# Patient Record
Sex: FEMALE | Race: WHITE
[De-identification: names, ages, dates, MRNs, and addresses within clinical notes are randomized per-mention and may not be internally consistent; named-entity substitution may affect disease eponyms.]

---

## 2017-09-23 ENCOUNTER — HOSPITAL ENCOUNTER (EMERGENCY)
Dept: HOSPITAL 8 - ED | Age: 62
Discharge: HOME | End: 2017-09-23
Payer: COMMERCIAL

## 2017-09-23 VITALS — DIASTOLIC BLOOD PRESSURE: 75 MMHG | SYSTOLIC BLOOD PRESSURE: 131 MMHG

## 2017-09-23 VITALS — WEIGHT: 239.2 LBS | HEIGHT: 67 IN | BODY MASS INDEX: 37.54 KG/M2

## 2017-09-23 DIAGNOSIS — K29.00: Primary | ICD-10-CM

## 2017-09-23 LAB
AST SERPL-CCNC: 10 U/L (ref 15–37)
BUN SERPL-MCNC: 10 MG/DL (ref 7–18)
HCT VFR BLD CALC: 48.3 % (ref 34.6–47.8)
HGB BLD-MCNC: 16.2 G/DL (ref 11.7–16.4)
WBC # BLD AUTO: 6.5 X10^3/UL (ref 3.4–10)

## 2017-09-23 PROCEDURE — 96361 HYDRATE IV INFUSION ADD-ON: CPT

## 2017-09-23 PROCEDURE — 83690 ASSAY OF LIPASE: CPT

## 2017-09-23 PROCEDURE — 85025 COMPLETE CBC W/AUTO DIFF WBC: CPT

## 2017-09-23 PROCEDURE — 81001 URINALYSIS AUTO W/SCOPE: CPT

## 2017-09-23 PROCEDURE — 36415 COLL VENOUS BLD VENIPUNCTURE: CPT

## 2017-09-23 PROCEDURE — 99285 EMERGENCY DEPT VISIT HI MDM: CPT

## 2017-09-23 PROCEDURE — 80053 COMPREHEN METABOLIC PANEL: CPT

## 2017-09-23 PROCEDURE — 87086 URINE CULTURE/COLONY COUNT: CPT

## 2017-09-23 PROCEDURE — 96374 THER/PROPH/DIAG INJ IV PUSH: CPT

## 2018-04-02 ENCOUNTER — OFFICE VISIT (OUTPATIENT)
Dept: URGENT CARE | Facility: CLINIC | Age: 63
End: 2018-04-02
Payer: COMMERCIAL

## 2018-04-02 VITALS
BODY MASS INDEX: 37.51 KG/M2 | HEART RATE: 67 BPM | HEIGHT: 67 IN | TEMPERATURE: 98.2 F | SYSTOLIC BLOOD PRESSURE: 128 MMHG | OXYGEN SATURATION: 93 % | WEIGHT: 239 LBS | DIASTOLIC BLOOD PRESSURE: 84 MMHG | RESPIRATION RATE: 20 BRPM

## 2018-04-02 DIAGNOSIS — G56.92 NEUROPATHY OF HAND, LEFT: ICD-10-CM

## 2018-04-02 PROCEDURE — 99204 OFFICE O/P NEW MOD 45 MIN: CPT | Performed by: PHYSICIAN ASSISTANT

## 2018-04-02 ASSESSMENT — ENCOUNTER SYMPTOMS
VOMITING: 0
DOUBLE VISION: 0
MUSCLE WEAKNESS: 0
SHORTNESS OF BREATH: 0
DIZZINESS: 0
FOCAL WEAKNESS: 0
NAUSEA: 0
TINGLING: 1
SENSORY CHANGE: 0
NUMBNESS: 0
PALPITATIONS: 0
HEADACHES: 0
COUGH: 0
CHILLS: 0
BLURRED VISION: 0
FEVER: 0

## 2018-04-02 NOTE — PROGRESS NOTES
"Subjective:      Aicha Choudhary is a 62 y.o. female who presents with Arm Pain (since midnight last night shes had left arm pain and numbness)            Arm Pain    Incident onset: no incident. Arm pain started 12 hours ago- improving  The incident occurred at home. The injury mechanism was repetitive motion (patient reports turning knobs all day yesterday. Woke up at midnight with pain and pins and needles in lower left arm). The pain is present in the left forearm and left hand. The quality of the pain is described as aching (tingling ). The pain is moderate. The pain has been improving since the incident. Associated symptoms include tingling. Pertinent negatives include no chest pain, muscle weakness or numbness. The symptoms are aggravated by movement. She has tried nothing for the symptoms.       Past Medical History:   Diagnosis Date   • Depression    • Hx of hysterectomy        Past Surgical History:   Procedure Laterality Date   • HERNIA REPAIR     • OTHER  left knee surgery       No family history on file.    Allergies   Allergen Reactions   • Keflex    • Penicillins        Medications, Allergies, and current problem list reviewed today in Epic    Review of Systems   Constitutional: Negative for chills, fever and malaise/fatigue.   Eyes: Negative for blurred vision and double vision.   Respiratory: Negative for cough and shortness of breath.    Cardiovascular: Negative for chest pain, palpitations and leg swelling.        No jaw pain or left shoulder pain    Gastrointestinal: Negative for nausea and vomiting.   Musculoskeletal: Positive for joint pain (left hand pain and swelling).   Skin: Negative for itching and rash.   Neurological: Positive for tingling. Negative for dizziness, sensory change, focal weakness, numbness and headaches.       All other systems reviewed and are negative.      Objective:     /84   Pulse 67   Temp 36.8 °C (98.2 °F)   Resp 20   Ht 1.702 m (5' 7\")   Wt 108.4 kg " (239 lb)   SpO2 93%   BMI 37.43 kg/m²      Physical Exam   Constitutional: She is oriented to person, place, and time. She appears well-developed and well-nourished. No distress.   HENT:   Head: Normocephalic and atraumatic.   Eyes: Conjunctivae are normal.   Cardiovascular: Normal rate, regular rhythm and normal heart sounds.  Exam reveals no gallop and no friction rub.    No murmur heard.  Pulmonary/Chest: Effort normal and breath sounds normal. No respiratory distress. She has no decreased breath sounds. She has no wheezes. She has no rhonchi. She has no rales.   Musculoskeletal:   Left hand: diffuse mild edema. FROM. Pain with flexion of fingers. Describes tingling along ulnar and radial nerve distribution. Tinnel's test negative for cubital tunnel syndrome. No erythema. Distal n/v intact.     Neurological: She is alert and oriented to person, place, and time. No cranial nerve deficit.   CN II-XII intact. Cerebellar function  intact. Motor coordination intact.  strength strong and symmetrical bilaterally. Proprioception intact. Strength 5/5 equal in the upper and lower extremities. Facial features symmetric with equal movement. No focal deficits      Psychiatric: She has a normal mood and affect. Her behavior is normal. Judgment and thought content normal.               Assessment/Plan:     1. Neuropathy of hand, left    - suspect CTS. Recommended EKG- patient refused.  Encouraged immobilization, decrease repetitive motion.  Brace given to patient. OTC NSAIDS    Strict ER precautions discussed.  ER evaluation if any worsening symptoms, paralysis, localized weakness, vision changes, confusion, slurred speech.     Differential diagnoses, Supportive care, and indications for immediate follow-up discussed with patient.   Instructed to return to clinic or nearest emergency department for any change in condition, further concerns, or worsening of symptoms.    The patient demonstrated a good understanding and  agreed with the treatment plan.    Ines Myers P.A.-C.

## 2018-04-02 NOTE — LETTER
April 2, 2018         Patient: Aicha Choudhary   YOB: 1955   Date of Visit: 4/2/2018           To Whom it May Concern:    Aicha Choudhary was seen in my clinic on 4/2/2018. She may return to work on 4/4/18 if feeling better.    If you have any questions or concerns, please don't hesitate to call.        Sincerely,           Ines Myers P.A.-C.  Electronically Signed

## 2018-04-04 ENCOUNTER — OFFICE VISIT (OUTPATIENT)
Dept: URGENT CARE | Facility: CLINIC | Age: 63
End: 2018-04-04
Payer: COMMERCIAL

## 2018-04-04 VITALS
SYSTOLIC BLOOD PRESSURE: 128 MMHG | RESPIRATION RATE: 16 BRPM | HEART RATE: 60 BPM | OXYGEN SATURATION: 95 % | DIASTOLIC BLOOD PRESSURE: 80 MMHG | TEMPERATURE: 98.8 F | BODY MASS INDEX: 37.67 KG/M2 | HEIGHT: 67 IN | WEIGHT: 240 LBS

## 2018-04-04 DIAGNOSIS — M79.2 NEUROPATHIC PAIN OF LEFT HAND: ICD-10-CM

## 2018-04-04 PROCEDURE — 99213 OFFICE O/P EST LOW 20 MIN: CPT | Performed by: NURSE PRACTITIONER

## 2018-04-04 RX ORDER — METHYLPREDNISOLONE 4 MG/1
4 TABLET ORAL DAILY
Qty: 1 KIT | Refills: 0 | Status: SHIPPED | OUTPATIENT
Start: 2018-04-04 | End: 2019-01-18 | Stop reason: CLARIF

## 2018-04-04 ASSESSMENT — ENCOUNTER SYMPTOMS
TINGLING: 1
SENSORY CHANGE: 1
CONSTITUTIONAL NEGATIVE: 1
GASTROINTESTINAL NEGATIVE: 1
RESPIRATORY NEGATIVE: 1

## 2018-04-04 ASSESSMENT — PAIN SCALES - GENERAL: PAINLEVEL: 4=SLIGHT-MODERATE PAIN

## 2018-04-04 NOTE — PROGRESS NOTES
"Subjective:      Aicha Choudhary is a 62 y.o. female who presents with Wrist Pain (arm and hand numbness, when take brace off hand still goes numb and would like the steroids that were discussed on monday )  Surgical HX reviewed in epic and not pertinent to today's visit  Past Medical History:   Diagnosis Date   • Depression    • Hx of hysterectomy      Current medications reviewed.  Social History   Substance Use Topics   • Smoking status: Former Smoker   • Smokeless tobacco: Not on file   • Alcohol use Yes      Comment: occasional             HPI  Chief Complaint   Patient presents with   • Wrist Pain     arm and hand numbness, when take brace off hand still goes numb and would like the steroids that were discussed on monday    as above. She was seen 2 days ago for same complaint. She stats it is not better. She complains of numbness to her 4th and 5th digits. She has her brace in place. Pain 4/10 and more annoying. She has chronic pain management. She is NOT requesting narcotics. No other aggravating or alleviating factors.     Review of Systems   Constitutional: Negative.    HENT: Negative.    Respiratory: Negative.    Gastrointestinal: Negative.    Musculoskeletal:        Left hand/elbow pain   Skin: Negative.    Neurological: Positive for tingling and sensory change.   All other systems reviewed and are negative.         Objective:     /80   Pulse 60   Temp 37.1 °C (98.8 °F)   Resp 16   Ht 1.702 m (5' 7\")   Wt 108.9 kg (240 lb)   SpO2 95%   Breastfeeding? No   BMI 37.59 kg/m²      Physical Exam   Constitutional: She is oriented to person, place, and time. She appears well-developed and well-nourished.   HENT:   Head: Normocephalic.   Eyes: EOM are normal.   Neck: Normal range of motion.   Pulmonary/Chest: Effort normal. No respiratory distress.   Musculoskeletal:   Left elbow/wrist with mild pain with ROM   Distal ROM intact    Neurological: She is alert and oriented to person, place, and " time.   Skin: Skin is warm and dry. Capillary refill takes less than 2 seconds.   Psychiatric: She has a normal mood and affect.   Nursing note and vitals reviewed.         Reviewed note for 4/2/18      Assessment/Plan:     1. Neuropathic pain of left hand  MethylPREDNISolone (MEDROL DOSEPAK) 4 MG Tablet Therapy Pack     Work note  F/u with PCP - if no resolve would recommend OT and possible injections    Please make an appointment for follow up with your primary care provider. Return for any change in condition, worsening of symptoms, or any other concerns. Please go to the nearest emergency room for any shortness of breath or chest pain or for any of the emergent conditions we have discussed. Patient states understanding to plan of care and discharge instructions.    Please note that this dictation was created using voice recognition software. I have worked with consultants from the vendor as well as technical experts from [facility] [employer] to optimize the interface. I have made every reasonable attempt to correct obvious errors, but I expect that there are errors of grammar and possibly content that I did not discover before finalizing the note.

## 2018-04-04 NOTE — LETTER
April 4, 2018         Patient: Aicha Choudhary   YOB: 1955   Date of Visit: 4/4/2018           To Whom it May Concern:    Aicha Choudhary was seen in my clinic on 4/4/2018. Please excuse her from work today. She may return to work on next scheduled day of 4/8/18 without restrictions     If you have any questions or concerns, please don't hesitate to call.        Sincerely,           TARYN Locke.P.N.  Electronically Signed

## 2018-05-07 ENCOUNTER — OFFICE VISIT (OUTPATIENT)
Dept: URGENT CARE | Facility: CLINIC | Age: 63
End: 2018-05-07
Payer: COMMERCIAL

## 2018-05-07 VITALS
RESPIRATION RATE: 18 BRPM | BODY MASS INDEX: 38.92 KG/M2 | DIASTOLIC BLOOD PRESSURE: 78 MMHG | TEMPERATURE: 98.2 F | OXYGEN SATURATION: 92 % | WEIGHT: 248 LBS | HEART RATE: 72 BPM | HEIGHT: 67 IN | SYSTOLIC BLOOD PRESSURE: 120 MMHG

## 2018-05-07 DIAGNOSIS — H11.89 CONJUNCTIVAL IRRITATION: ICD-10-CM

## 2018-05-07 DIAGNOSIS — H11.001 PTERYGIUM EYE, RIGHT: ICD-10-CM

## 2018-05-07 PROCEDURE — 99203 OFFICE O/P NEW LOW 30 MIN: CPT | Performed by: EMERGENCY MEDICINE

## 2018-05-07 RX ORDER — POLYMYXIN B SULFATE AND TRIMETHOPRIM 1; 10000 MG/ML; [USP'U]/ML
1 SOLUTION OPHTHALMIC EVERY 4 HOURS
Qty: 10 ML | Refills: 0 | Status: SHIPPED | OUTPATIENT
Start: 2018-05-07 | End: 2019-01-18 | Stop reason: CLARIF

## 2018-05-07 RX ORDER — KETOROLAC TROMETHAMINE 5 MG/ML
1 SOLUTION OPHTHALMIC 4 TIMES DAILY
Qty: 1 BOTTLE | Refills: 0 | Status: SHIPPED | OUTPATIENT
Start: 2018-05-07 | End: 2019-01-18 | Stop reason: CLARIF

## 2018-05-07 ASSESSMENT — ENCOUNTER SYMPTOMS
EYE ITCHING: 0
PHOTOPHOBIA: 0
FEVER: 0
DOUBLE VISION: 0
FOREIGN BODY SENSATION: 1
EYE DISCHARGE: 1
EYE REDNESS: 1
BLURRED VISION: 0
NAUSEA: 0
VOMITING: 0

## 2018-05-07 NOTE — PROGRESS NOTES
Subjective:      Aicha Choudhary is a 62 y.o. female who presents with Eye Problem (Since yesterday right eye weeping .)            Eye Problem    The right eye is affected. This is a new problem. Episode onset: 2 days. The problem occurs daily. The problem has been gradually worsening. There was no injury mechanism. The pain is mild. There is no known exposure to pink eye. She does not wear contacts. Associated symptoms include an eye discharge, eye redness and a foreign body sensation. Pertinent negatives include no blurred vision, double vision, fever, itching, nausea, photophobia, recent URI or vomiting. Treatments tried: cool compress. The treatment provided mild relief.       Review of Systems   Constitutional: Negative for fever.   Eyes: Positive for discharge and redness. Negative for blurred vision, double vision, photophobia and itching.   Gastrointestinal: Negative for nausea and vomiting.   Skin: Negative for rash.   Endo/Heme/Allergies: Positive for environmental allergies.     PMH:  has a past medical history of Depression and hysterectomy.  MEDS:   Current Outpatient Prescriptions:   •  polymixin-trimethoprim (POLYTRIM) 95174-3.1 UNIT/ML-% Solution, Place 1 Drop in both eyes every 4 hours., Disp: 10 mL, Rfl: 0  •  ketorolac (ACULAR) 0.5 % Solution, Place 1 Drop in both eyes 4 times a day., Disp: 1 Bottle, Rfl: 0  •  lorazepam (ATIVAN) 1 MG TABS, Take 1 mg by mouth 2 Times a Day., Disp: , Rfl:   •  Oxycodone-Acetaminophen (PERCOCET-10)  MG TABS, Take 1 Tab by mouth every four hours as needed., Disp: , Rfl:   •  MethylPREDNISolone (MEDROL DOSEPAK) 4 MG Tablet Therapy Pack, Take 1 Tab by mouth every day., Disp: 1 Kit, Rfl: 0  •  duloxetine (CYMBALTA) 60 MG CPEP delayed-release capsule, Take 60 mg by mouth every day., Disp: , Rfl:   •  HYDROmorphone (DILAUDID) 2 MG TABS, Take 1-2 Tabs by mouth every 6 hours as needed for Severe Pain., Disp: 30 Tab, Rfl: 0  ALLERGIES:   Allergies   Allergen  "Reactions   • Keflex    • Penicillins      SURGHX:   Past Surgical History:   Procedure Laterality Date   • HERNIA REPAIR     • OTHER  left knee surgery     SOCHX:  reports that she quit smoking about 10 years ago. She has never used smokeless tobacco. She reports that she drinks alcohol. She reports that she does not use drugs.  FH: family history is not on file.       Objective:     /78   Pulse 72   Temp 36.8 °C (98.2 °F)   Resp 18   Ht 1.702 m (5' 7\")   Wt 112.5 kg (248 lb)   SpO2 92%   BMI 38.84 kg/m²      Physical Exam   Constitutional: She is oriented to person, place, and time. Vital signs are normal. She appears well-developed and well-nourished. She is cooperative.   HENT:   Head: Normocephalic.   Nose: No rhinorrhea.   Mouth/Throat: Mucous membranes are normal.   Eyes: EOM are normal. Pupils are equal, round, and reactive to light. Lids are everted and swept, no foreign bodies found. Right eye exhibits discharge. Right eye exhibits no chemosis, no exudate and no hordeolum. No foreign body present in the right eye. Right conjunctiva is injected. Right conjunctiva has no hemorrhage.       Neck: Phonation normal. Neck supple.   Pulmonary/Chest: Effort normal.   Lymphadenopathy:        Head (right side): No preauricular adenopathy present.        Head (left side): No preauricular adenopathy present.   Neurological: She is alert and oriented to person, place, and time.   Skin: Skin is dry.   Psychiatric: She has a normal mood and affect.               Assessment/Plan:     1. Conjunctival irritation  - polymixin-trimethoprim (POLYTRIM) 06823-4.1 UNIT/ML-% Solution; Place 1 Drop in both eyes every 4 hours.  Dispense: 10 mL; Refill: 0  - ketorolac (ACULAR) 0.5 % Solution; Place 1 Drop in both eyes 4 times a day.  Dispense: 1 Bottle; Refill: 0    2. Pterygium eye, right  REF OPHTHALMOLOGY      "

## 2018-05-07 NOTE — LETTER
May 7, 2018       Patient: Aicha Choudhary   YOB: 1955   Date of Visit: 5/7/2018         To Whom It May Concern:    Aicha Choudhary is not able to attend work today or tomorrow.      Sincerely,          Eduardo Reyna M.D.  Electronically Signed

## 2018-07-23 ENCOUNTER — OCCUPATIONAL MEDICINE (OUTPATIENT)
Dept: URGENT CARE | Facility: CLINIC | Age: 63
End: 2018-07-23
Payer: COMMERCIAL

## 2018-07-23 ENCOUNTER — APPOINTMENT (OUTPATIENT)
Dept: RADIOLOGY | Facility: IMAGING CENTER | Age: 63
End: 2018-07-23
Attending: NURSE PRACTITIONER
Payer: COMMERCIAL

## 2018-07-23 VITALS
HEIGHT: 66 IN | WEIGHT: 242 LBS | TEMPERATURE: 97.5 F | DIASTOLIC BLOOD PRESSURE: 85 MMHG | SYSTOLIC BLOOD PRESSURE: 130 MMHG | OXYGEN SATURATION: 98 % | BODY MASS INDEX: 38.89 KG/M2 | HEART RATE: 68 BPM

## 2018-07-23 DIAGNOSIS — S90.31XA CONTUSION OF RIGHT FOOT, INITIAL ENCOUNTER: ICD-10-CM

## 2018-07-23 PROCEDURE — 73630 X-RAY EXAM OF FOOT: CPT | Mod: TC,FY,RT,29 | Performed by: NURSE PRACTITIONER

## 2018-07-23 PROCEDURE — 99213 OFFICE O/P EST LOW 20 MIN: CPT | Performed by: NURSE PRACTITIONER

## 2018-07-23 ASSESSMENT — ENCOUNTER SYMPTOMS
FEVER: 0
CHILLS: 0

## 2018-07-23 NOTE — PROGRESS NOTES
"Subjective:      Aicha Choudhary is a 62 y.o. female who presents with Foot Problem (Week ago metal busby fell off over right foot while work , swollenand pain)      DOI: 7/16/18  Patient was at work on 7/16 when a metal bar was dropped twice on her right foot accidentally by a co-worker. Has been having pain and swelling since. Has tried ice, elevation and nsaids with minimal relief. No second job.      Other   This is a new problem. Episode onset: 7/16/18. The problem occurs constantly. The problem has been waxing and waning. Pertinent negatives include no chills or fever. Nothing aggravates the symptoms. She has tried NSAIDs, ice and rest for the symptoms. The treatment provided mild relief.       Review of Systems   Constitutional: Negative for chills, fever and malaise/fatigue.   Musculoskeletal:        Right foot pain and contusion   All other systems reviewed and are negative.         Objective:     /85   Pulse 68   Temp 36.4 °C (97.5 °F)   Ht 1.676 m (5' 6\")   Wt 109.8 kg (242 lb)   SpO2 98%   BMI 39.06 kg/m²      Physical Exam   Constitutional: She is oriented to person, place, and time. She appears well-developed and well-nourished.   Musculoskeletal:        Feet:    Contusion proximal to the toes with point tenderness; no obivious deformity    Neurological: She is alert and oriented to person, place, and time.   Skin: Capillary refill takes less than 2 seconds.   Psychiatric: She has a normal mood and affect. Her behavior is normal. Judgment and thought content normal.   Vitals reviewed.      There is slight discoloration noted to the dorsal aspect of the right foot just proximal to the digits. Point tenderness over the digits and the dorsal aspect of the foot. There is also mild point tenderness to the plantar aspect of the foot.       XR foot: interpreted by me, I do not see any acute fracture at this time.   RAdiology read is pending.      Assessment/Plan:     1. Contusion of right " foot, initial encounter  - DX-FOOT-COMPLETE 3+ RIGHT; Future  --See D39 for restrictions  -ibuprofen PRN  -ice  -elevate  -return on 7/27/18 for follow up

## 2018-07-23 NOTE — LETTER
[facility] [address] - [place] [address]  Phone:  [phone] - Fax:  [phone]   [facility] Progress Report and Disability Certification  Date of Service: 7/23/2018   No Show:  No  Date / Time of Next Visit: 7/27/2018   Claim Information   Patient Name: Aicha Choudhary  Claim Number:     Employer: [employer]  Date of Injury: 7/16/2018     Insurer / TPA: [employer]  ID / SSN:     Occupation: PRODUCTION ASSCIATE  Diagnosis: The encounter diagnosis was Contusion of right foot, initial encounter.    Medical Information   Related to Industrial Injury? Yes    Subjective Complaints:  DOI: 7/16/18  Patient was at work on 7/16 when a metal bar was dropped twice on her right foot accidentally by a co-worker. Has been having pain and swelling since. Has tried ice, elevation and nsaids with minimal relief. No second job.    Objective Findings: There is slight discoloration noted to the dorsal aspect of the right foot just proximal to the digits. Point tenderness over the digits and the dorsal aspect of the foot. There is also mild point tenderness to the plantar aspect of the foot.    Pre-Existing Condition(s):     Assessment:   Initial Visit    Status: Additional Care Required  Permanent Disability:No    Plan: Medication    Diagnostics: X-ray    Comments:  X-ray interpreted by me; no acute fracture seen.  Radiology read is pending.     Disability Information   Status: Released to Restricted Duty    From:  7/23/2018  Through: 7/27/2018 Restrictions are: Temporary   Physical Restrictions   Sitting:    Standing:  < or = to 4 hrs/day Stooping:    Bending:      Squatting:    Walking:  < or = to 4 hrs/day Climbing:  < or = to 4 hrs/day Pushing:      Pulling:    Other:    Reaching Above Shoulder (L):   Reaching Above Shoulder (R):       Reaching Below Shoulder (L):    Reaching Below Shoulder (R):      Not to exceed Weight Limits      Carrying(hrs):   Weight Limit(lb):   Lifting(hrs):   Weight  Limit(lb):     Comments: Ice, elevate when resting. Ibuprofen. Return on 7/17/18 for follow up    Repetitive Actions   Hands: i.e. Fine Manipulations from Grasping:     Feet: i.e. Operating Foot Controls:     Driving / Operate Machinery:     Physician Name: Cathey J Hamman, A.P.N. Physician Signature: e-SignHAMMAN, CATHEY J A.P.N. e-Signature: Dr. Reed Orr, Medical Director   Clinic Name / Location: [address] [place] [address] Clinic Phone Number: Dept: [phone]   Appointment Time: 10:45 Am Visit Start Time: 10:52 AM   Check-In Time:  10:41 Am Visit Discharge Time:  12:05 PM   Original-Treating Physician or Chiropractor    Page 2-Insurer/TPA    Page 3-Employer    Page 4-Employee

## 2018-07-23 NOTE — LETTER
"EMPLOYEE’S CLAIM FOR COMPENSATION/ REPORT OF INITIAL TREATMENT  FORM C-4    EMPLOYEE’S CLAIM - PROVIDE ALL INFORMATION REQUESTED   First Name  Aicha Last Name  Kenrick Birthdate                    1955                Sex  female Claim Number   Home Address  [address] Age  62 y.o. 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 (if applicable)  LINE WORK    How did this injury or occupational disease occur? (Be specific an answer in detail. Use additional sheet if necessary)  ANGIE GRACE DROPPED STEEL BAR ON MY FOOT TWICE   If you believe that you have an occupational disease, when did you first have knowledge of the disability and it relationship to your employment?  NA Witnesses to the Accident  MAYBE CANDE      Nature of Injury or Occupational Disease  Sprain  Part(s) of Body Injured or Affected  Foot (R), N/A, N/A    I certify that the above is true and correct to the best of my knowledge and that I have provided this information in order to obtain the benefits of [place] " inclusive or [place]).  I hereby authorize any physician, chiropractor, surgeon, practitioner, or other person, any hospital, including [facility], any medical service organization, any insurance company, or other institution or organization to release to each other, any medical or other information, including benefits paid or payable, pertinent to this injury or disease, except information relative to diagnosis, treatment and/or counseling for AIDS, psychological conditions, alcohol or controlled substances, for which I must give specific authorization.  A Photostat of this authorization shall be as valid as the original.     Date   Place   Employee’s Signature   THIS REPORT MUST BE COMPLETED AND MAILED WITHIN 3 WORKING DAYS OF TREATMENT   Place  [facility]  Name of Facility  [facility]   Date  7/23/2018 Diagnosis  (S90.31XA) Contusion of right foot, initial encounter Is there evidence the injured employee was under the influence of alcohol and/or another controlled substance at the time of accident?   Hour  10:52 AM Description of Injury or Disease  The encounter diagnosis was Contusion of right foot, initial encounter. No   Treatment  Rest, ice, elevate, ibuprofen  Have you advised the patient to remain off work five days or more? No   X-Ray Findings  Negative  Comments:radiology read pending at this time   If Yes   From Date  To Date      From information given by the employee, together with medical evidence, can you directly connect this injury or occupational disease as job incurred?  Yes If No Full Duty  No Modified Duty  Yes   Is additional medical care by a physician indicated?  Yes If Modified Duty, Specify any Limitations / Restrictions  Limited standing, walking, and climbing.    Do you know of any previous injury or disease contributing to this condition or occupational disease?                            No   Date  7/23/2018 Print Doctor’s  "Name Cathey J Hamman, A.P.N. I certify the employer’s copy of  this form was mailed on:   Address  [address] Insurer’s Use Only     [facility] Zip  [address]    Provider’s Tax ID Number  [id number] Telephone  Dept: [phone]        meg-SignHAMMAN, CATHEY J A.P.N.   e-Signature: Dr. Reed Orr, Medical Director Degree  APN        ORIGINAL-TREATING PHYSICIAN OR CHIROPRACTOR    PAGE 2-INSURER/TPA    PAGE 3-EMPLOYER    PAGE 4-EMPLOYEE             Form C-4 (rev10/07)              BRIEF DESCRIPTION OF RIGHTS AND BENEFITS  (Pursuant to [place]RS 616C.050)    Notice of Injury or Occupational Disease (Incident Report Form C-1): If an injury or occupational disease (OD) arises out of and in the  course of employment, you must provide written notice to your employer as soon as practicable, but no later than 7 days after the accident or  OD. Your employer shall maintain a sufficient supply of the required forms.    Claim for Compensation (Form C-4): If medical treatment is sought, the form C-4 is available at the place of initial treatment. A completed  \"Claim for Compensation\" (Form C-4) must be filed within 90 days after an accident or OD. The treating physician or chiropractor must,  within 3 working days after treatment, complete and mail to the employer, the employer's insurer and third-party , the Claim for  Compensation.    Medical Treatment: If you require medical treatment for your on-the-job injury or OD, you may be required to select a physician or  chiropractor from a list provided by your workers’ compensation insurer, if it has contracted with an Organization for Managed Care (MCO) or  Preferred Provider Organization (PPO) or providers of health care. If your employer has not entered into a contract with an MCO or PPO, you  may select a physician or chiropractor from the Panel of Physicians and Chiropractors. Any medical costs related to your industrial injury " or  OD will be paid by your insurer.    Temporary Total Disability (TTD): If your doctor has certified that you are unable to work for a period of at least 5 consecutive days, or 5  cumulative days in a 20-day period, or places restrictions on you that your employer does not accommodate, you may be entitled to TTD  compensation.    Temporary Partial Disability (TPD): If the wage you receive upon reemployment is less than the compensation for TTD to which you are  entitled, the insurer may be required to pay you TPD compensation to make up the difference. TPD can only be paid for a maximum of 24  months.    Permanent Partial Disability (PPD): When your medical condition is stable and there is an indication of a PPD as a result of your injury or  OD, within 30 days, your insurer must arrange for an evaluation by a rating physician or chiropractor to determine the degree of your PPD. The  amount of your PPD award depends on the date of injury, the results of the PPD evaluation and your age and wage.    Permanent Total Disability (PTD): If you are medically certified by a treating physician or chiropractor as permanently and totally disabled  and have been granted a PTD status by your insurer, you are entitled to receive monthly benefits not to exceed 66 2/3% of your average  monthly wage. The amount of your PTD payments is subject to reduction if you previously received a PPD award.    Vocational Rehabilitation Services: You may be eligible for vocational rehabilitation services if you are unable to return to the job due to a  permanent physical impairment or permanent restrictions as a result of your injury or occupational disease.    Transportation and Per Jessica Reimbursement: You may be eligible for travel expenses and per jessica associated with medical treatment.    Reopening: You may be able to reopen your claim if your condition worsens after claim closure.    Appeal Process: If you disagree with a written  determination issued by the insurer or the insurer does not respond to your request, you may  appeal to the Department of Administration, , by following the instructions contained in your determination letter. You must  appeal the determination within 70 days from the date of the determination letter at [address], [address], [place], [place]  [address], or [address], [address], [place], [place] [address]. If you disagree with the  decision, you may appeal to the  Department of Administration, . You must file your appeal within 30 days from the date of the  decision  letter at [address], [address], [place], [place] [address], or [address], [address], [place], [place] [address]. If you  disagree with a decision of an , you may file a petition for judicial review with the District Court. You must do so within 30  days of the Appeal Officer’s decision. You may be represented by an  at your own expense or you may contact the [facility] for possible  representation.    [place]  for Injured Workers (NAIW): If you disagree with a  decision, you may request that NAIW represent you  without charge at an  Hearing. For information regarding denial of benefits, you may contact the [facility] at: [address], [address], [place] [address], [phone], or [address], [place] [address], [phone]) [phone]    To File a Complaint with the Division: If you wish to file a complaint with the  of the Division of Industrial Relations (DIR),  please contact the Workers’ Compensation Section, [address], [address], [place], [place] [address], telephone ([phone]) [phone], or  [address], [address], [place] [address], telephone (947) [phone].    For assistance with Workers’ Compensation Issues: you may contact the  Office of the Governor Consumer Health Assistance, [address] [place] [address], [address], [place] [address], Toll Free 1-150.842.6531, Web site: http://govcha.[facility].nv., E-mail  Lynn@[facility].nv.                                                                                                                                                                                                                                   __________________________________________________________________                                                                   _________________                Employee Name / Signature                                                                                                                                                       Date                                                                                                                                                                                                     D-2 (rev. 10/07)

## 2018-07-27 ENCOUNTER — OCCUPATIONAL MEDICINE (OUTPATIENT)
Dept: URGENT CARE | Facility: CLINIC | Age: 63
End: 2018-07-27
Payer: COMMERCIAL

## 2018-07-27 VITALS
DIASTOLIC BLOOD PRESSURE: 70 MMHG | HEART RATE: 65 BPM | OXYGEN SATURATION: 93 % | WEIGHT: 240 LBS | RESPIRATION RATE: 16 BRPM | SYSTOLIC BLOOD PRESSURE: 120 MMHG | HEIGHT: 67 IN | TEMPERATURE: 98.9 F | BODY MASS INDEX: 37.67 KG/M2

## 2018-07-27 DIAGNOSIS — S90.31XA CONTUSION OF RIGHT FOOT, INITIAL ENCOUNTER: ICD-10-CM
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
## 2018-07-27 PROCEDURE — 99213 OFFICE O/P EST LOW 20 MIN: CPT | Mod: 29 | Performed by: NURSE PRACTITIONER

## 2018-07-27 ASSESSMENT — LIFESTYLE VARIABLES: SUBSTANCE_ABUSE: 0

## 2018-07-27 ASSESSMENT — ENCOUNTER SYMPTOMS
DIZZINESS: 0
TINGLING: 0
CHILLS: 0
FEVER: 0

## 2018-07-27 NOTE — LETTER
"   [facility]  [address] - [place] [address]  Phone:  [phone] - Fax:  [phone]   [facility] Progress Report and Disability Certification  Date of Service: 7/27/2018   No Show:  No  Date / Time of Next Visit: 8/6/2018   Claim Information   Patient Name: Aicha Choudhary  Claim Number:     Employer: [employer]  Date of Injury: 7/16/2018     Insurer / TPA: [employer]  ID / SSN:     Occupation: PRODUCTION ASSCIATE  Diagnosis: Diagnoses of Work related injury and Contusion of right foot, initial encounter were pertinent to this visit.    Medical Information   Related to Industrial Injury? Yes    Subjective Complaints:  DOI 07/16/18. Another employee dropped a steel bar on her foot twice. She is still having pain 2/10. Occ wakes her up from sleep ( not for the past two nights). She takes pain pills for another chronic pain problem which has helped her pain. Pain is achy. Walks without assistance.  Has not worked since her injury. Her employer could not meet the previous restrictions. Other treatments tried: elevation, ice.    No other employers. Previous surgery to right foot \" I can't remember what I had done it was so long ago\".    Objective Findings: Inspection of right foot normal. No ecchymosis, ROSEMARY or deformity. FROM to foot and ankle. Mild TTP at midfoot, dorsal side. Gait is antalgic favoring right foot.    Pre-Existing Condition(s):     Assessment:   Initial Visit    Status: Additional Care Required  Permanent Disability:No    Plan:      Diagnostics:      Comments:       Disability Information   Status: Released to Restricted Duty    From:  7/27/2018  Through: 8/6/2018 Restrictions are: Temporary   Physical Restrictions   Sitting:    Standing:    Stooping:    Bending:      Squatting:    Walking:  < or = to 6 hrs/day Climbing:  < or = to 2 hrs/day  Comments:no climbing ladders. Limited stair climbing.  Pushing:    "   Pulling:    Other:    Reaching Above Shoulder (L):   Reaching Above Shoulder (R):       Reaching Below Shoulder (L):    Reaching Below Shoulder (R):      Not to exceed Weight Limits   Carrying(hrs):   Weight Limit(lb):   Lifting(hrs):   Weight  Limit(lb):     Comments:      Repetitive Actions   Hands: i.e. Fine Manipulations from Grasping:     Feet: i.e. Operating Foot Controls: < or = to 6 hrs/day  Comments:right foot   Driving / Operate Machinery:     Physician Name: Lashon Mazariegos A.PMargieNMargie Physician Signature:   e-Signature: Dr. Reed Orr, Medical Director   Clinic Name / Location: [address]  [place] [address] Clinic Phone Number: Dept: [phone]   Appointment Time: 1:30 Pm Visit Start Time: 1:13 PM   Check-In Time:  1:08 Pm Visit Discharge Time:  1:52 PM   Original-Treating Physician or Chiropractor    Page 2-Insurer/TPA    Page 3-Employer    Page 4-Employee

## 2018-07-27 NOTE — PROGRESS NOTES
"Subjective:      Aicha Choudhary is a 62 y.o. female who presents with Follow-Up (right foot injury)         Denies past medical, surgical or family history that is significant to today's problem.   RX or OTC medications reviewed with patient today.   Allergies   Allergen Reactions   • Keflex    • Penicillins          HPI DOI 07/16/18. Another employee dropped a steel bar on her foot twice. She is still having pain 2/10. Occ wakes her up from sleep ( not for the past two nights). She takes pain pills for another chronic pain problem which has helped her pain. Pain is achy. Walks without assistance.  Has not worked since her injury. Her employer could not meet the previous restrictions. Other treatments tried: elevation, ice.    No other employers. Previous surgery to right foot \" I can't remember what I had done it was so long ago\".     Review of Systems   Constitutional: Negative for chills and fever.   Neurological: Negative for dizziness and tingling.   Psychiatric/Behavioral: Negative for substance abuse.          Objective:     /70   Pulse 65   Temp 37.2 °C (98.9 °F)   Resp 16   Ht 1.702 m (5' 7\")   Wt 108.9 kg (240 lb)   SpO2 93%   BMI 37.59 kg/m²      Physical Exam   Constitutional: She appears well-developed and well-nourished.   Cardiovascular: Normal rate.    Pulmonary/Chest: Effort normal.   Musculoskeletal:        Right foot: There is tenderness. There is normal range of motion, no bony tenderness, no swelling, normal capillary refill, no crepitus, no deformity and no laceration.        Feet:    Skin: Skin is warm.   Psychiatric: She has a normal mood and affect. Her behavior is normal. Thought content normal.   Nursing note and vitals reviewed.      Inspection of right foot normal. No ecchymosis, ROSEMARY or deformity. FROM to foot and ankle. Mild TTP at midfoot, dorsal side. Gait is antalgic favoring right foot.        Assessment/Plan:     1. Work related injury     2. Contusion of right " foot, initial encounter       See [place]39   Return in 10 days for re-evaluation.

## 2018-08-06 ENCOUNTER — OCCUPATIONAL MEDICINE (OUTPATIENT)
Dept: URGENT CARE | Facility: CLINIC | Age: 63
End: 2018-08-06
Payer: COMMERCIAL

## 2018-08-06 VITALS
OXYGEN SATURATION: 96 % | WEIGHT: 240 LBS | TEMPERATURE: 97.8 F | HEART RATE: 88 BPM | HEIGHT: 67 IN | DIASTOLIC BLOOD PRESSURE: 78 MMHG | SYSTOLIC BLOOD PRESSURE: 120 MMHG | RESPIRATION RATE: 18 BRPM | BODY MASS INDEX: 37.67 KG/M2

## 2018-08-06 DIAGNOSIS — S90.31XD CONTUSION OF RIGHT FOOT, SUBSEQUENT ENCOUNTER: ICD-10-CM

## 2018-08-06 PROCEDURE — 99213 OFFICE O/P EST LOW 20 MIN: CPT | Mod: 29 | Performed by: NURSE PRACTITIONER

## 2018-08-06 ASSESSMENT — ENCOUNTER SYMPTOMS
DIZZINESS: 0
FEVER: 0
SHORTNESS OF BREATH: 0
HEADACHES: 0
NAUSEA: 0
COUGH: 0
PALPITATIONS: 0
MYALGIAS: 0
DIARRHEA: 0
SORE THROAT: 0
VOMITING: 0
CHILLS: 0

## 2018-08-06 NOTE — LETTER
[facility] [address] - [place] [address]  Phone:  [phone] - Fax:  [phone]   [facility] Progress Report and Disability Certification  Date of Service: 8/6/2018   No Show:  No  Date / Time of Next Visit: 8/13/2018   Claim Information   Patient Name: Aicha Choudhary  Claim Number:     Employer: [employer]  Date of Injury: 7/16/2018     Insurer / TPA: [employer]  ID / SSN:     Occupation: PRODUCTION ASSCIATE  Diagnosis: The encounter diagnosis was Contusion of right foot, subsequent encounter.    Medical Information   Related to Industrial Injury?      Subjective Complaints:  DOI 07/16/18- another employee dropped a steel bar on her foot twice. He continues to have pain. She states she wakes at night with twitching in her foot. She states she is able to walk on it some but is unable to bear weight too long or she will get swelling in her foot. She does have a history of previous surgery on her same foot. She is taking her pain medicine that she uses chronically.   Objective Findings: No obvious ecchymosis, erythema or deformity. There is mild pain at the mid foot dorsal side. Inspection of the foot is otherwise normal with a previous surgical scar by her first metatarsal.   Pre-Existing Condition(s):     Assessment:   Condition Same    Status: Additional Care Required  Permanent Disability:No    Plan:      Diagnostics:      Comments:       Disability Information   Status: Released to Restricted Duty    From:  8/6/2018  Through: 8/13/2018 Restrictions are: Temporary   Physical Restrictions   Sitting:    Standing:    Stooping:    Bending:      Squatting:    Walking:  < or = to 6 hrs/day Climbing:  < or = to 4 hrs/day Pushing:      Pulling:    Other:    Reaching Above Shoulder (L):   Reaching Above Shoulder (R):       Reaching Below Shoulder (L):    Reaching Below Shoulder (R):      Not to exceed Weight Limits   Carrying(hrs):    Weight Limit(lb):   Lifting(hrs):   Weight  Limit(lb):     Comments: The patient for follow-up with occupational health physician at [facility]  Continue same restrictions at this time. Continue to use anti-inflammatories and ice    Repetitive Actions   Hands: i.e. Fine Manipulations from Grasping:     Feet: i.e. Operating Foot Controls: < or = to 4 hrs/day  Comments:right foot only   Driving / Operate Machinery:     Physician Name: Ines Patel A.P.NMargie Physician Signature:   e-Signature: Dr. Reed Orr, Medical Director   Clinic Name / Location: [facility] [address] [place] [address] Clinic Phone Number: Dept: [phone]   Appointment Time: 4:00 Pm Visit Start Time: 3:17 PM   Check-In Time:  3:06 Pm Visit Discharge Time: 3:51 PM    Original-Treating Physician or Chiropractor    Page 2-Insurer/TPA    Page 3-Employer    Page 4-Employee

## 2018-08-06 NOTE — PROGRESS NOTES
"Subjective:      Aicha Choudhary is a 62 y.o. female who presents with Foot Problem (Follow up)      DOI 07/16/18- another employee dropped a steel bar on her foot twice. He continues to have pain. She states she wakes at night with twitching in her foot. She states she is able to walk on it some but is unable to bear weight too long or she will get swelling in her foot. She does have a history of previous surgery on her same foot. She is taking her pain medicine that she uses chronically.     HPI    Review of Systems   Constitutional: Negative for chills, fever and malaise/fatigue.   HENT: Negative for congestion and sore throat.    Respiratory: Negative for cough and shortness of breath.    Cardiovascular: Negative for chest pain and palpitations.   Gastrointestinal: Negative for diarrhea, nausea and vomiting.   Genitourinary: Negative for dysuria.   Musculoskeletal: Positive for joint pain. Negative for myalgias.   Skin: Negative for rash.   Neurological: Negative for dizziness and headaches.          Objective:     /78   Pulse 88   Temp 36.6 °C (97.8 °F)   Resp 18   Ht 1.702 m (5' 7\")   Wt 108.9 kg (240 lb)   SpO2 96%   BMI 37.59 kg/m²      Physical Exam   Constitutional: She is oriented to person, place, and time. She appears well-developed and well-nourished. No distress.   HENT:   Head: Normocephalic and atraumatic.   Eyes: Conjunctivae are normal.   Cardiovascular: Normal rate.    Pulmonary/Chest: Effort normal.   Abdominal: Soft.   Musculoskeletal: Normal range of motion. She exhibits tenderness.        Right foot: There is tenderness and bony tenderness. There is no swelling and no deformity.   Neurological: She is alert and oriented to person, place, and time.   Skin: Skin is warm and dry.   Psychiatric: She has a normal mood and affect. Her behavior is normal. Judgment and thought content normal.   Nursing note and vitals reviewed.      No obvious ecchymosis, erythema or deformity. " There is mild pain at the mid foot dorsal side. Inspection of the foot is otherwise normal with a previous surgical scar by her first metatarsal.       Assessment/Plan:     1. Contusion of right foot, subsequent encounter  Follow up with [facility].  Called Lorenzo shin [facility] to discuss case.

## 2018-08-09 ENCOUNTER — OCCUPATIONAL MEDICINE (OUTPATIENT)
Dept: OCCUPATIONAL MEDICINE | Facility: CLINIC | Age: 63
End: 2018-08-09
Payer: COMMERCIAL

## 2018-08-09 VITALS
WEIGHT: 241 LBS | DIASTOLIC BLOOD PRESSURE: 80 MMHG | OXYGEN SATURATION: 92 % | BODY MASS INDEX: 37.83 KG/M2 | HEIGHT: 67 IN | SYSTOLIC BLOOD PRESSURE: 124 MMHG | TEMPERATURE: 98.3 F | HEART RATE: 75 BPM

## 2018-08-09 DIAGNOSIS — S90.31XD CONTUSION OF RIGHT FOOT, SUBSEQUENT ENCOUNTER: ICD-10-CM

## 2018-08-09 PROCEDURE — 99203 OFFICE O/P NEW LOW 30 MIN: CPT | Performed by: PREVENTIVE MEDICINE

## 2018-08-09 ASSESSMENT — PAIN SCALES - GENERAL: PAINLEVEL: 6=MODERATE PAIN

## 2018-08-09 NOTE — LETTER
[address],   [address] [address]  Phone:  [phone] - Fax:  [phone]   [facility] Progress Report and Disability Certification  Date of Service: 8/9/2018   No Show:  No  Date / Time of Next Visit: 8/16/2018 @ 1:15 PM    Claim Information   Patient Name: Aicha Choudhary  Claim Number:     Employer: [employer]  Date of Injury: 7/16/2018     Insurer / TPA: [employer]  ID / SSN:     Occupation: PRODUCTION ASSCIATE  Diagnosis: The encounter diagnosis was Contusion of right foot, subsequent encounter.    Medical Information   Related to Industrial Injury? Yes    Subjective Complaints:  DOI 07/16/18: 61 yo female presents with right foot injury. Another employee dropped a steel bar on her foot twice.  She was seen in the urgent care multiple times, x-rays of the right foot were negative for any acute findings.  Patient states overall pain is about the same.  Pain extends from the first MTP laterally across the foot to the base of the fifth MTP.  Patient states she gets occasional swelling if she has been on her feet for long period of time.  She has oxycodone for an unrelated condition which she takes but continues to have right foot pain despite that condition.  She notes she had a bunionectomy 30 years ago on the right foot.   Objective Findings: Right foot: Surgical scar consistent with prior surgery.  Tenderness to palpation from the first MTP to the fifth MTP.  Full range of motion of digits and ankle.  Slight antalgic gait.   Pre-Existing Condition(s):     Assessment:   Condition Same    Status: Additional Care Required  Permanent Disability:No    Plan:      Diagnostics:      Comments:  Prescribed diclofenac gel  Continue restricted duty  Follow up 1 week    Disability Information   Status: Released to Restricted Duty    From:  8/9/2018  Through: 8/16/2018 Restrictions are: Temporary   Physical Restrictions   Sitting:   Standing:    Stooping:    Bending:      Squatting:    Walking:  < or = to 6 hrs/day Climbing:  < or = to 4 hrs/day Pushing:      Pulling:    Other:    Reaching Above Shoulder (L):   Reaching Above Shoulder (R):       Reaching Below Shoulder (L):    Reaching Below Shoulder (R):      Not to exceed Weight Limits   Carrying(hrs):   Weight Limit(lb):   Lifting(hrs):   Weight  Limit(lb):     Comments:      Repetitive Actions   Hands: i.e. Fine Manipulations from Grasping:     Feet: i.e. Operating Foot Controls:     Driving / Operate Machinery:     Physician Name: Casper Sheehan D.O. Physician Signature: CASPER Hernandes D.O. e-Signature: Dr. Reed Orr, Medical Director   Clinic Name / Location: [address],   [address] [address] Clinic Phone Number: Dept: [phone]   Appointment Time: 2:15 Pm Visit Start Time: 1:49 PM   Check-In Time:  1:29 Pm Visit Discharge Time:  2:11 PM    Original-Treating Physician or Chiropractor    Page 2-Insurer/TPA    Page 3-Employer    Page 4-Employee

## 2018-08-09 NOTE — PROGRESS NOTES
"Subjective:      Aicha Choudhary is a 62 y.o. female who presents with Follow-Up ( DOI (07/16/2018) - right foot - better)      DOI 07/16/18: 63 yo female presents with right foot injury. Another employee dropped a steel bar on her foot twice.  She was seen in the urgent care multiple times, x-rays of the right foot were negative for any acute findings.  Patient states overall pain is about the same.  Pain extends from the first MTP laterally across the foot to the base of the fifth MTP.  Patient states she gets occasional swelling if she has been on her feet for long period of time.  She has oxycodone for an unrelated condition which she takes but continues to have right foot pain despite that condition.  She notes she had a bunionectomy 30 years ago on the right foot.     HPI    ROS  ROS: All systems were reviewed on intake form, form was reviewed and signed. See scanned documents in media. Pertinent positives and negatives included in HPI.    PMH: Bunionectomy 30 years ago right foot  MEDS: Medications were reviewed in Epic  ALLERGIES:   Allergies   Allergen Reactions   • Keflex    • Penicillins      SOCHX: Works as  at [employer]  FH: No pertinent family history to this problem     Objective:     /80   Pulse 75   Temp 36.8 °C (98.3 °F)   Ht 1.702 m (5' 7\")   Wt 109.3 kg (241 lb)   SpO2 92%   BMI 37.75 kg/m²      Physical Exam   Constitutional: She is oriented to person, place, and time. She appears well-developed and well-nourished.   HENT:   Right Ear: External ear normal.   Left Ear: External ear normal.   Eyes: Conjunctivae and EOM are normal.   Cardiovascular: Normal rate.    Pulmonary/Chest: Effort normal.   Neurological: She is alert and oriented to person, place, and time.   Skin: Skin is warm and dry.   Psychiatric: She has a normal mood and affect.       Right foot: Surgical scar consistent with prior surgery.  Tenderness to palpation from the first MTP to the fifth " MTP.  Full range of motion of digits and ankle.  Slight antalgic gait.       Assessment/Plan:     1. Contusion of right foot, subsequent encounter  - Diclofenac Sodium 1 % Gel; Apply 1 Application to skin as directed 3 times a day as needed.  Dispense: 1 Tube; Refill: 0    Prescribed diclofenac gel  Continue restricted duty  Follow up 1 week

## 2018-08-16 ENCOUNTER — OCCUPATIONAL MEDICINE (OUTPATIENT)
Dept: OCCUPATIONAL MEDICINE | Facility: CLINIC | Age: 63
End: 2018-08-16
Payer: COMMERCIAL

## 2018-08-16 VITALS
HEART RATE: 65 BPM | SYSTOLIC BLOOD PRESSURE: 116 MMHG | DIASTOLIC BLOOD PRESSURE: 60 MMHG | OXYGEN SATURATION: 96 % | HEIGHT: 67 IN | WEIGHT: 241 LBS | RESPIRATION RATE: 16 BRPM | TEMPERATURE: 98.9 F | BODY MASS INDEX: 37.83 KG/M2

## 2018-08-16 DIAGNOSIS — S90.31XD CONTUSION OF RIGHT FOOT, SUBSEQUENT ENCOUNTER: ICD-10-CM

## 2018-08-16 PROCEDURE — 99213 OFFICE O/P EST LOW 20 MIN: CPT | Performed by: PREVENTIVE MEDICINE

## 2018-08-16 ASSESSMENT — ENCOUNTER SYMPTOMS: TINGLING: 1

## 2018-08-16 NOTE — PROGRESS NOTES
Subjective:      Aicha Choudhary is a 62 y.o. female who presents with Follow-Up (WC DOI 7/16/18 RT foot - )      DOI 07/16/18: 63 yo female presents with right foot injury. Another employee dropped a steel bar on her foot twice.  She was seen in the urgent care multiple times, x-rays of the right foot were negative for any acute findings.  Patient states overall pain is about the same.  She states the pain continues to extend from the first MTP laterally across the foot to the base of the fifth MTP.  She spends most the pain is along the fifth MTP.  She states she gets sharp shooting pains in the middle of night.  She states she was not able to fill the diclofenac, she stated needed to be preapproved.     HPI    Review of Systems   Skin: Negative for itching and rash.   Neurological: Positive for tingling.     SOCHX: Works as a  at [employer]  FH: No pertinent family history to this problem.     Objective:     There were no vitals taken for this visit.     Physical Exam   Constitutional: She is oriented to person, place, and time. She appears well-developed and well-nourished.   Cardiovascular: Normal rate.    Pulmonary/Chest: Effort normal.   Neurological: She is alert and oriented to person, place, and time.   Skin: Skin is warm and dry.   Psychiatric: She has a normal mood and affect.       Right foot: Surgical scar consistent with prior surgery.  Tenderness to palpation from the first MTP to the fifth MTP, more pain along the fifth MTP.  Full range of motion of digits and ankle.  Slight antalgic gait.       Assessment/Plan:     1. Contusion of right foot, subsequent encounter  Refilled diclofenac gel  Recommended trying lidocaine cream as well  Continue restricted duty  Follow-up 2 weeks, if no improvement will consider further imaging

## 2018-08-16 NOTE — LETTER
[address],   [address] [address]  Phone:  [phone] - Fax:  [phone]   [facility] Progress Report and Disability Certification  Date of Service: 8/16/2018   No Show:  No  Date / Time of Next Visit: 9/4/2018 @1:00 PM    Claim Information   Patient Name: Aicha Choudhary  Claim Number:     Employer: [employer]  Date of Injury: 7/16/2018     Insurer / TPA: [employer]  ID / SSN:     Occupation: PRODUCTION ASSCIATE  Diagnosis: The encounter diagnosis was Contusion of right foot, subsequent encounter.    Medical Information   Related to Industrial Injury? Yes    Subjective Complaints:  DOI 07/16/18: 63 yo female presents with right foot injury. Another employee dropped a steel bar on her foot twice.  She was seen in the urgent care multiple times, x-rays of the right foot were negative for any acute findings.  Patient states overall pain is about the same.  She states the pain continues to extend from the first MTP laterally across the foot to the base of the fifth MTP.  She spends most the pain is along the fifth MTP.  She states she gets sharp shooting pains in the middle of night.  She states she was not able to fill the diclofenac, she stated needed to be preapproved.   Objective Findings: Right foot: Surgical scar consistent with prior surgery.  Tenderness to palpation from the first MTP to the fifth MTP, more pain along the fifth MTP.  Full range of motion of digits and ankle.  Slight antalgic gait.   Pre-Existing Condition(s):     Assessment:   Condition Same    Status: Additional Care Required  Permanent Disability:No    Plan:      Diagnostics:      Comments:  Refilled diclofenac gel  Recommended trying lidocaine cream as well  Continue restricted duty  Follow-up 2 weeks, if no improvement will consider further imaging    Disability Information   Status: Released to Restricted Duty    From:  8/16/2018  Through:  9/4/2018 Restrictions are: Temporary   Physical Restrictions   Sitting:    Standing:    Stooping:    Bending:      Squatting:    Walking:  < or = to 6 hrs/day Climbing:  < or = to 4 hrs/day Pushing:      Pulling:    Other:    Reaching Above Shoulder (L):   Reaching Above Shoulder (R):       Reaching Below Shoulder (L):    Reaching Below Shoulder (R):      Not to exceed Weight Limits   Carrying(hrs):   Weight Limit(lb):   Lifting(hrs):   Weight  Limit(lb):     Comments:      Repetitive Actions   Hands: i.e. Fine Manipulations from Grasping:     Feet: i.e. Operating Foot Controls:     Driving / Operate Machinery:     Physician Name: Casper Sheehan D.O. Physician Signature: CASPER Hernandes D.O. e-Signature: Dr. Reed Orr, Medical Director   Clinic Name / Location: [address],   [address] [place] [address] Clinic Phone Number: Dept: [phone]   Appointment Time: 1:15 Pm Visit Start Time: 1:10 PM   Check-In Time:  12:20 Pm Visit Discharge Time:  1:33 PM    Original-Treating Physician or Chiropractor    Page 2-Insurer/TPA    Page 3-Employer    Page 4-Employee

## 2018-09-02 ENCOUNTER — APPOINTMENT (OUTPATIENT)
Dept: RADIOLOGY | Facility: MEDICAL CENTER | Age: 63
End: 2018-09-02
Attending: EMERGENCY MEDICINE
Payer: COMMERCIAL

## 2018-09-02 ENCOUNTER — HOSPITAL ENCOUNTER (EMERGENCY)
Facility: MEDICAL CENTER | Age: 63
End: 2018-09-02
Attending: EMERGENCY MEDICINE
Payer: COMMERCIAL

## 2018-09-02 VITALS
HEIGHT: 67 IN | TEMPERATURE: 97.9 F | WEIGHT: 244.05 LBS | SYSTOLIC BLOOD PRESSURE: 137 MMHG | BODY MASS INDEX: 38.3 KG/M2 | OXYGEN SATURATION: 95 % | HEART RATE: 70 BPM | DIASTOLIC BLOOD PRESSURE: 67 MMHG | RESPIRATION RATE: 18 BRPM

## 2018-09-02 DIAGNOSIS — R07.9 CHEST PAIN IN ADULT: ICD-10-CM

## 2018-09-02 DIAGNOSIS — R11.2 NON-INTRACTABLE VOMITING WITH NAUSEA, UNSPECIFIED VOMITING TYPE: ICD-10-CM

## 2018-09-02 DIAGNOSIS — R51.9 ACUTE NONINTRACTABLE HEADACHE, UNSPECIFIED HEADACHE TYPE: ICD-10-CM

## 2018-09-02 LAB
ALBUMIN SERPL BCP-MCNC: 4.1 G/DL (ref 3.2–4.9)
ALBUMIN/GLOB SERPL: 1.2 G/DL
ALP SERPL-CCNC: 106 U/L (ref 30–99)
ALT SERPL-CCNC: 20 U/L (ref 2–50)
ANION GAP SERPL CALC-SCNC: 9 MMOL/L (ref 0–11.9)
AST SERPL-CCNC: 23 U/L (ref 12–45)
BASOPHILS # BLD AUTO: 0.9 % (ref 0–1.8)
BASOPHILS # BLD: 0.08 K/UL (ref 0–0.12)
BILIRUB SERPL-MCNC: 0.6 MG/DL (ref 0.1–1.5)
BNP SERPL-MCNC: 76 PG/ML (ref 0–100)
BUN SERPL-MCNC: 15 MG/DL (ref 8–22)
CALCIUM SERPL-MCNC: 8.3 MG/DL (ref 8.4–10.2)
CHLORIDE SERPL-SCNC: 96 MMOL/L (ref 96–112)
CO2 SERPL-SCNC: 25 MMOL/L (ref 20–33)
CREAT SERPL-MCNC: 0.99 MG/DL (ref 0.5–1.4)
EOSINOPHIL # BLD AUTO: 0.21 K/UL (ref 0–0.51)
EOSINOPHIL NFR BLD: 2.4 % (ref 0–6.9)
ERYTHROCYTE [DISTWIDTH] IN BLOOD BY AUTOMATED COUNT: 45.5 FL (ref 35.9–50)
GLOBULIN SER CALC-MCNC: 3.5 G/DL (ref 1.9–3.5)
GLUCOSE SERPL-MCNC: 143 MG/DL (ref 65–99)
HCT VFR BLD AUTO: 50.1 % (ref 37–47)
HGB BLD-MCNC: 16.6 G/DL (ref 12–16)
IMM GRANULOCYTES # BLD AUTO: 0.09 K/UL (ref 0–0.11)
IMM GRANULOCYTES NFR BLD AUTO: 1 % (ref 0–0.9)
LIPASE SERPL-CCNC: 37 U/L (ref 7–58)
LYMPHOCYTES # BLD AUTO: 1.14 K/UL (ref 1–4.8)
LYMPHOCYTES NFR BLD: 12.8 % (ref 22–41)
MCH RBC QN AUTO: 31.5 PG (ref 27–33)
MCHC RBC AUTO-ENTMCNC: 33.1 G/DL (ref 33.6–35)
MCV RBC AUTO: 95.1 FL (ref 81.4–97.8)
MONOCYTES # BLD AUTO: 0.57 K/UL (ref 0–0.85)
MONOCYTES NFR BLD AUTO: 6.4 % (ref 0–13.4)
NEUTROPHILS # BLD AUTO: 6.84 K/UL (ref 2–7.15)
NEUTROPHILS NFR BLD: 76.5 % (ref 44–72)
NRBC # BLD AUTO: 0 K/UL
NRBC BLD-RTO: 0 /100 WBC
PLATELET # BLD AUTO: 174 K/UL (ref 164–446)
PMV BLD AUTO: 11.2 FL (ref 9–12.9)
POTASSIUM SERPL-SCNC: 4.4 MMOL/L (ref 3.6–5.5)
PROT SERPL-MCNC: 7.6 G/DL (ref 6–8.2)
RBC # BLD AUTO: 5.27 M/UL (ref 4.2–5.4)
SODIUM SERPL-SCNC: 130 MMOL/L (ref 135–145)
TROPONIN I SERPL-MCNC: 0.02 NG/ML (ref 0–0.04)
WBC # BLD AUTO: 8.9 K/UL (ref 4.8–10.8)

## 2018-09-02 PROCEDURE — 99285 EMERGENCY DEPT VISIT HI MDM: CPT

## 2018-09-02 PROCEDURE — 700102 HCHG RX REV CODE 250 W/ 637 OVERRIDE(OP): Performed by: EMERGENCY MEDICINE

## 2018-09-02 PROCEDURE — 84484 ASSAY OF TROPONIN QUANT: CPT

## 2018-09-02 PROCEDURE — 85025 COMPLETE CBC W/AUTO DIFF WBC: CPT

## 2018-09-02 PROCEDURE — 83880 ASSAY OF NATRIURETIC PEPTIDE: CPT

## 2018-09-02 PROCEDURE — 71275 CT ANGIOGRAPHY CHEST: CPT

## 2018-09-02 PROCEDURE — 96375 TX/PRO/DX INJ NEW DRUG ADDON: CPT

## 2018-09-02 PROCEDURE — 36415 COLL VENOUS BLD VENIPUNCTURE: CPT

## 2018-09-02 PROCEDURE — 94760 N-INVAS EAR/PLS OXIMETRY 1: CPT

## 2018-09-02 PROCEDURE — 700111 HCHG RX REV CODE 636 W/ 250 OVERRIDE (IP): Performed by: EMERGENCY MEDICINE

## 2018-09-02 PROCEDURE — 93005 ELECTROCARDIOGRAM TRACING: CPT | Performed by: EMERGENCY MEDICINE

## 2018-09-02 PROCEDURE — 700117 HCHG RX CONTRAST REV CODE 255: Performed by: EMERGENCY MEDICINE

## 2018-09-02 PROCEDURE — A9270 NON-COVERED ITEM OR SERVICE: HCPCS | Performed by: EMERGENCY MEDICINE

## 2018-09-02 PROCEDURE — 83690 ASSAY OF LIPASE: CPT

## 2018-09-02 PROCEDURE — 71046 X-RAY EXAM CHEST 2 VIEWS: CPT

## 2018-09-02 PROCEDURE — 96374 THER/PROPH/DIAG INJ IV PUSH: CPT

## 2018-09-02 PROCEDURE — 80053 COMPREHEN METABOLIC PANEL: CPT

## 2018-09-02 RX ORDER — ONDANSETRON 2 MG/ML
4 INJECTION INTRAMUSCULAR; INTRAVENOUS ONCE
Status: COMPLETED | OUTPATIENT
Start: 2018-09-02 | End: 2018-09-02

## 2018-09-02 RX ORDER — ONDANSETRON 4 MG/1
4 TABLET, ORALLY DISINTEGRATING ORAL EVERY 6 HOURS PRN
Qty: 5 TAB | Refills: 0 | Status: SHIPPED | OUTPATIENT
Start: 2018-09-02 | End: 2019-01-18 | Stop reason: CLARIF

## 2018-09-02 RX ORDER — ASPIRIN 81 MG/1
324 TABLET, CHEWABLE ORAL ONCE
Status: COMPLETED | OUTPATIENT
Start: 2018-09-02 | End: 2018-09-02

## 2018-09-02 RX ADMIN — ONDANSETRON 4 MG: 2 INJECTION INTRAMUSCULAR; INTRAVENOUS at 00:42

## 2018-09-02 RX ADMIN — ASPIRIN 81 MG CHEWABLE TABLET 324 MG: 81 TABLET CHEWABLE at 00:41

## 2018-09-02 RX ADMIN — LIDOCAINE HYDROCHLORIDE 15 ML: 20 SOLUTION OROPHARYNGEAL at 03:03

## 2018-09-02 RX ADMIN — IOHEXOL 100 ML: 350 INJECTION, SOLUTION INTRAVENOUS at 02:25

## 2018-09-02 RX ADMIN — HYDROMORPHONE HYDROCHLORIDE 1 MG: 1 INJECTION, SOLUTION INTRAMUSCULAR; INTRAVENOUS; SUBCUTANEOUS at 00:43

## 2018-09-02 ASSESSMENT — PAIN SCALES - GENERAL: PAINLEVEL_OUTOF10: 6

## 2018-09-02 NOTE — DISCHARGE INSTRUCTIONS
General Headache Without Cause  A headache is pain or discomfort felt around the head or neck area. The specific cause of a headache may not be found. There are many causes and types of headaches. A few common ones are:  · Tension headaches.  · Migraine headaches.  · Cluster headaches.  · Chronic daily headaches.  Follow these instructions at home:  Watch your condition for any changes. Take these steps to help with your condition:  Managing pain  · Take over-the-counter and prescription medicines only as told by your health care provider.  · Lie down in a dark, quiet room when you have a headache.  · If directed, apply ice to the head and neck area:  ¨ Put ice in a plastic bag.  ¨ Place a towel between your skin and the bag.  ¨ Leave the ice on for 20 minutes, 2-3 times per day.  · Use a heating pad or hot shower to apply heat to the head and neck area as told by your health care provider.  · Keep lights dim if bright lights bother you or make your headaches worse.  Eating and drinking  · Eat meals on a regular schedule.  · Limit alcohol use.  · Decrease the amount of caffeine you drink, or stop drinking caffeine.  General instructions  · Keep all follow-up visits as told by your health care provider. This is important.  · Keep a headache journal to help find out what may trigger your headaches. For example, write down:  ¨ What you eat and drink.  ¨ How much sleep you get.  ¨ Any change to your diet or medicines.  · Try massage or other relaxation techniques.  · Limit stress.  · Sit up straight, and do not tense your muscles.  · Do not use tobacco products, including cigarettes, chewing tobacco, or e-cigarettes. If you need help quitting, ask your health care provider.  · Exercise regularly as told by your health care provider.  · Sleep on a regular schedule. Get 7-9 hours of sleep, or the amount recommended by your health care provider.  Contact a health care provider if:  · Your symptoms are not helped by  medicine.  · You have a headache that is different from the usual headache.  · You have nausea or you vomit.  · You have a fever.  Get help right away if:  · Your headache becomes severe.  · You have repeated vomiting.  · You have a stiff neck.  · You have a loss of vision.  · You have problems with speech.  · You have pain in the eye or ear.  · You have muscular weakness or loss of muscle control.  · You lose your balance or have trouble walking.  · You feel faint or pass out.  · You have confusion.  This information is not intended to replace advice given to you by your health care provider. Make sure you discuss any questions you have with your health care provider.  Document Released: 12/18/2006 Document Revised: 05/25/2017 Document Reviewed: 04/11/2016  [employer] Interactive Patient Education © 2017 [employer]      Nonspecific Chest Pain  Chest pain can be caused by many different conditions. There is always a chance that your pain could be related to something serious, such as a heart attack or a blood clot in your lungs. Chest pain can also be caused by conditions that are not life-threatening. If you have chest pain, it is very important to follow up with your health care provider.  What are the causes?  Causes of this condition include:  · Heartburn.  · Pneumonia or bronchitis.  · Anxiety or stress.  · Inflammation around your heart (pericarditis) or lung (pleuritis or pleurisy).  · A blood clot in your lung.  · A collapsed lung (pneumothorax). This can develop suddenly on its own (spontaneous pneumothorax) or from trauma to the chest.  · Shingles infection (varicella-zoster virus).  · Heart attack.  · Damage to the bones, muscles, and cartilage that make up your chest wall. This can include:  ¨ Bruised bones due to injury.  ¨ Strained muscles or cartilage due to frequent or repeated coughing or overwork.  ¨ Fracture to one or more ribs.  ¨ Sore cartilage due to inflammation (costochondritis).  What  increases the risk?  Risk factors for this condition may include:  · Activities that increase your risk for trauma or injury to your chest.  · Respiratory infections or conditions that cause frequent coughing.  · Medical conditions or overeating that can cause heartburn.  · Heart disease or family history of heart disease.  · Conditions or health behaviors that increase your risk of developing a blood clot.  · Having had chicken pox (varicella zoster).  What are the signs or symptoms?  Chest pain can feel like:  · Burning or tingling on the surface of your chest or deep in your chest.  · Crushing, pressure, aching, or squeezing pain.  · Dull or sharp pain that is worse when you move, cough, or take a deep breath.  · Pain that is also felt in your back, neck, shoulder, or arm, or pain that spreads to any of these areas.  Your chest pain may come and go, or it may stay constant.  How is this diagnosed?  Lab tests or other studies may be needed to find the cause of your pain. Your health care provider may have you take a test called an ECG (electrocardiogram). An ECG records your heartbeat patterns at the time the test is performed. You may also have other tests, such as:  · Transthoracic echocardiogram (TTE). In this test, sound waves are used to create a picture of the heart structures and to look at how blood flows through your heart.  · Transesophageal echocardiogram (VERO). This is a more advanced imaging test that takes images from inside your body. It allows your health care provider to see your heart in finer detail.  · Cardiac monitoring. This allows your health care provider to monitor your heart rate and rhythm in real time.  · Holter monitor. This is a portable device that records your heartbeat and can help to diagnose abnormal heartbeats. It allows your health care provider to track your heart activity for several days, if needed.  · Stress tests. These can be done through exercise or by taking medicine  that makes your heart beat more quickly.  · Blood tests.  · Other imaging tests.  How is this treated?  Treatment depends on what is causing your chest pain. Treatment may include:  · Medicines. These may include:  ¨ Acid blockers for heartburn.  ¨ Anti-inflammatory medicine.  ¨ Pain medicine for inflammatory conditions.  ¨ Antibiotic medicine, if an infection is present.  ¨ Medicines to dissolve blood clots.  ¨ Medicines to treat coronary artery disease (CAD).  · Supportive care for conditions that do not require medicines. This may include:  ¨ Resting.  ¨ Applying heat or cold packs to injured areas.  ¨ Limiting activities until pain decreases.  Follow these instructions at home:  Medicines  · If you were prescribed an antibiotic, take it as told by your health care provider. Do not stop taking the antibiotic even if you start to feel better.  · Take over-the-counter and prescription medicines only as told by your health care provider.  Lifestyle  · Do not use any products that contain nicotine or tobacco, such as cigarettes and e-cigarettes. If you need help quitting, ask your health care provider.  · Do not drink alcohol.  · Make lifestyle changes as directed by your health care provider. These may include:  ¨ Getting regular exercise. Ask your health care provider to suggest some activities that are safe for you.  ¨ Eating a heart-healthy diet. A registered dietitian can help you to learn healthy eating options.  ¨ Maintaining a healthy weight.  ¨ Managing diabetes, if necessary.  ¨ Reducing stress, such as with yoga or relaxation techniques.  General instructions  · Avoid any activities that bring on chest pain.  · If heartburn is the cause for your chest pain, raise (elevate) the head of your bed about 6 inches (15 cm) by putting blocks under the legs. Sleeping with more pillows does not effectively relieve heartburn because it only changes the position of your head.  · Keep all follow-up visits as told by  your health care provider. This is important. This includes any further testing if your chest pain does not go away.  Contact a health care provider if:  · Your chest pain does not go away.  · You have a rash with blisters on your chest.  · You have a fever.  · You have chills.  Get help right away if:  · Your chest pain is worse.  · You have a cough that gets worse, or you cough up blood.  · You have severe pain in your abdomen.  · You have severe weakness.  · You faint.  · You have sudden, unexplained chest discomfort.  · You have sudden, unexplained discomfort in your arms, back, neck, or jaw.  · You have shortness of breath at any time.  · You suddenly start to sweat, or your skin gets clammy.  · You feel nauseous or you vomit.  · You suddenly feel light-headed or dizzy.  · Your heart begins to beat quickly, or it feels like it is skipping beats.  These symptoms may represent a serious problem that is an emergency. Do not wait to see if the symptoms will go away. Get medical help right away. Call your local emergency services (911 in the [place] Do not drive yourself to the hospital.   This information is not intended to replace advice given to you by your health care provider. Make sure you discuss any questions you have with your health care provider.  Document Released: 09/27/2006 Document Revised: 09/11/2017 Document Reviewed: 09/11/2017  [employer] Interactive Patient Education © 2017 [employer]

## 2018-09-02 NOTE — ED NOTES
"..  Chief Complaint   Patient presents with   • Chest Pain     per pt started on friday   • Headache   • N/V     ./67   Pulse 98   Temp 36.3 °C (97.3 °F)   Resp 18   Ht 1.702 m (5' 7\")   Wt 110.7 kg (244 lb 0.8 oz)   SpO2 90%   BMI 38.22 kg/m²     "

## 2018-09-02 NOTE — ED NOTES
Pt medicated per ERP order; pt given a cup of water and instructed to drink slowly for PO challenge

## 2018-09-02 NOTE — ED PROVIDER NOTES
"ED Provider Note    CHIEF COMPLAINT  Chief Complaint   Patient presents with   • Chest Pain     per pt started on friday   • Headache   • N/V        HPI    Primary care provider: Mitchell Alegre M.D.   History obtained from: Patient  History limited by: None     Aicha Choudhary is a 62 y.o. female who presents to the ED complaining of midsternal chest pain that started 2 nights ago.  She reports that the pain lasted for 8 hours and describes the pain as \"like it is tearing: with radiation to the back.  She reports that the pain resolved but continues to have intermittent episodes since then.  She is also complaining of headache at this time.  She otherwise denies pain anywhere else.  She reports about 10 episodes of nausea vomiting and thought that it was due to reflux.  She tried taking reflux medicine without improvement.  She states that she cannot hold any of her medications down.  She reports feeling hot and sweaty but did not check to see if she had a fever.  She has had a little bit of cough.  She reports similar symptoms about a month ago which \"lasted for quite a while\" but resolved on its own and she did not seek medical treatment.  She denies any dysuria or hematuria.  She reports feeling a little constipated.  She denies known history of heart problems.      REVIEW OF SYSTEMS  Please see HPI for pertinent positives/negatives.  All other systems reviewed and are negative.     PAST MEDICAL HISTORY  Past Medical History:   Diagnosis Date   • Depression    • Hx of hysterectomy         SURGICAL HISTORY  Past Surgical History:   Procedure Laterality Date   • HERNIA REPAIR     • OTHER  left knee surgery        SOCIAL HISTORY  Social History     Social History Main Topics   • Smoking status: Former Smoker     Quit date: 1/7/2008   • Smokeless tobacco: Never Used   • Alcohol use Yes      Comment: occasional   • Drug use: No   • Sexual activity: Not on file        FAMILY HISTORY  No family history on file. " "    CURRENT MEDICATIONS  Home Medications     Reviewed by Haleigh Becker R.N. (Registered Nurse) on 09/02/18 at 0040  Med List Status: Not Addressed   Medication Last Dose Status   Diclofenac Sodium 1 % Gel 9/1/2018 Active   Diclofenac Sodium 1 % Gel 9/1/2018 Active   duloxetine (CYMBALTA) 60 MG CPEP delayed-release capsule 4/27/15 Active   HYDROmorphone (DILAUDID) 2 MG TABS 8/9/2018 Active   ketorolac (ACULAR) 0.5 % Solution Not Taking Active   lorazepam (ATIVAN) 1 MG TABS 9/1/2018 Active   MethylPREDNISolone (MEDROL DOSEPAK) 4 MG Tablet Therapy Pack Not Taking Active   Oxycodone-Acetaminophen (PERCOCET-10)  MG TABS 9/1/2018 Active   polymixin-trimethoprim (POLYTRIM) 59251-4.1 UNIT/ML-% Solution Not Taking Active                 ALLERGIES  Allergies   Allergen Reactions   • Keflex    • Penicillins         PHYSICAL EXAM  VITAL SIGNS: /67   Pulse 70   Temp 36.6 °C (97.9 °F)   Resp 18   Ht 1.702 m (5' 7\")   Wt 110.7 kg (244 lb 0.8 oz)   SpO2 95%   BMI 38.22 kg/m²  @LEE[[id number]::@     Pulse ox interpretation: 90% I interpret this pulse ox as borderline    Constitutional: Well developed, well nourished, alert in discomfort, nontoxic appearance    HENT: No external signs of trauma, normocephalic, oropharynx moist and clear, nose normal    Eyes: PERRL, conjunctiva without erythema, no discharge, no icterus    Neck: Soft and supple, trachea midline, no stridor, no tenderness, no LAD, no JVD, good ROM    Cardiovascular: Regular rate and rhythm, 2/6 SM, strong distal pulses and good perfusion    Thorax & Lungs: No respiratory distress, CTAB, mild upper sternal tenderness palpation without gross deformity/swelling/warmth/rash/crepitus   Abdomen: Soft, nontender, nondistended, no guarding, no rebound, normal BS    Back: No CVAT    Extremities: No cyanosis, no edema, no gross deformity, good ROM, no tenderness, intact distal pulses with brisk cap refill    Skin: Warm, dry, no pallor/cyanosis, no rash noted "    Lymphatic: No lymphadenopathy noted    Neuro: A/O times 3, no focal deficits noted    Psychiatric: Cooperative, anxious patient        DIAGNOSTIC STUDIES / PROCEDURES    EKG  12 Lead EKG obtained at 0034 and interpreted by me:   Rate: 86   Rhythm: Sinus rhythm   Ectopy: None  Intervals: Normal   Axis: Normal   Q Waves: None   QRS: Late precordial R-wave transition  ST segments: Minimal ST depression inferior and lateral leads  T Waves: Normal    Clinical Impression: Sinus rhythm with minimal ST depression inferior and lateral leads       LABS  All labs reviewed by me.     Results for orders placed or performed during the hospital encounter of 09/02/18   CBC WITH DIFFERENTIAL   Result Value Ref Range    WBC 8.9 4.8 - 10.8 K/uL    RBC 5.27 4.20 - 5.40 M/uL    Hemoglobin 16.6 (H) 12.0 - 16.0 g/dL    Hematocrit 50.1 (H) 37.0 - 47.0 %    MCV 95.1 81.4 - 97.8 fL    MCH 31.5 27.0 - 33.0 pg    MCHC 33.1 (L) 33.6 - 35.0 g/dL    RDW 45.5 35.9 - 50.0 fL    Platelet Count 174 164 - 446 K/uL    MPV 11.2 9.0 - 12.9 fL    Neutrophils-Polys 76.50 (H) 44.00 - 72.00 %    Lymphocytes 12.80 (L) 22.00 - 41.00 %    Monocytes 6.40 0.00 - 13.40 %    Eosinophils 2.40 0.00 - 6.90 %    Basophils 0.90 0.00 - 1.80 %    Immature Granulocytes 1.00 (H) 0.00 - 0.90 %    Nucleated RBC 0.00 /100 WBC    Neutrophils (Absolute) 6.84 2.00 - 7.15 K/uL    Lymphs (Absolute) 1.14 1.00 - 4.80 K/uL    Monos (Absolute) 0.57 0.00 - 0.85 K/uL    Eos (Absolute) 0.21 0.00 - 0.51 K/uL    Baso (Absolute) 0.08 0.00 - 0.12 K/uL    Immature Granulocytes (abs) 0.09 0.00 - 0.11 K/uL    NRBC (Absolute) 0.00 K/uL   COMP METABOLIC PANEL   Result Value Ref Range    Sodium 130 (L) 135 - 145 mmol/L    Potassium 4.4 3.6 - 5.5 mmol/L    Chloride 96 96 - 112 mmol/L    Co2 25 20 - 33 mmol/L    Anion Gap 9.0 0.0 - 11.9    Glucose 143 (H) 65 - 99 mg/dL    Bun 15 8 - 22 mg/dL    Creatinine 0.99 0.50 - 1.40 mg/dL    Calcium 8.3 (L) 8.4 - 10.2 mg/dL    AST(SGOT) 23 12 - 45 U/L     ALT(SGPT) 20 2 - 50 U/L    Alkaline Phosphatase 106 (H) 30 - 99 U/L    Total Bilirubin 0.6 0.1 - 1.5 mg/dL    Albumin 4.1 3.2 - 4.9 g/dL    Total Protein 7.6 6.0 - 8.2 g/dL    Globulin 3.5 1.9 - 3.5 g/dL    A-G Ratio 1.2 g/dL   LIPASE   Result Value Ref Range    Lipase 37 7 - 58 U/L   TROPONIN   Result Value Ref Range    Troponin I 0.02 0.00 - 0.04 ng/mL   BTYPE NATRIURETIC PEPTIDE   Result Value Ref Range    B Natriuretic Peptide 76 0 - 100 pg/mL   ESTIMATED GFR   Result Value Ref Range    GFR If African American >60 >60 mL/min/1.73 m 2    GFR If Non  57 (A) >60 mL/min/1.73 m 2        RADIOLOGY  The radiologist's interpretation of all radiological studies have been reviewed by me.     CT-THORACIC AORTA EVALUATION   Final Result      No aortic aneurysm or dissection is seen.      Evaluation of the pulmonary arteries is significantly limited due to suboptimal contrast opacification due to bolus timing.      Prominence of the main pulmonary artery can be seen in pulmonary arterial hypertension.      4.4 mm lingular pulmonary nodule.      Patchy groundglass opacity in the right middle lobe may be infectious or inflammatory.      Hepatic steatosis.      Low Risk: No routine follow-up      High Risk: Optional CT at 12 months      Comments: Nodules less than 6 mm do not require routine follow-up, but certain patients at high risk with suspicious nodule morphology, upper lobe location, or both may warrant 12-month follow-up.      Low Risk - Minimal or absent history of smoking and of other known risk factors.      High Risk - History of smoking or of other known risk factors.      Note: These recommendations do not apply to lung cancer screening, patients with immunosuppression, or patients with known primary cancer.      Fleischner Society 2017 Guidelines for Management of Incidentally Detected Pulmonary Nodules in Adults            DX-CHEST-2 VIEWS   Final Result      No acute cardiopulmonary process is  identified.             COURSE & MEDICAL DECISION MAKING  Nursing notes, VS, PMSFHx reviewed in chart.     Review of past medical records shows the patient was recently seen at occupational health for right foot contusion.      Differential diagnoses considered include but are not limited to: AMI, dissection, PE, pneumothorax, pneumomediastinum, CHF/pulm edema, pericardial effusion/tamponade, pericarditis, pneumonia, pleural effusion, pleurisy, costochondritis, mediastinitis, esophageal rupture, esophageal spasm, GERD, gastritis, PUD, hiatal hernia, pancreatitis, muscle strain       Pt risk-stratified as low risk for MACE in the next 6 weeks by low HEART Score (0-3): 3    HISTORY  Highly suspicious +2  Moderately suspicious +1  Slightly suspicious 0    EKG  Significant ST depression +2  Nonspecific repolarization disturbance +1  Normal 0    AGE  ? 65 +2  45-65 +1  < 45 0    RISK FACTORS  Hypercholesterolemia, HTN, DM, Cigarette smoking, positive family history, obesity  ? 3 risk factors or history of atherosclerotic disease +2  1-2 risk factors +1  No risk factors known 0    TROPONIN  ? 3× normal limit +2  1-3× normal limit +1  ? normal limit 0      Patient presents to the ED with above complaint.  EKG showed sinus rhythm with nonspecific ST changes.  Chest x-ray without evidence of acute abnormality.  Labs are fairly unremarkable except for mild hyponatremia and hyperglycemia without evidence of DKA.  Given patient's reported history of tearing chest pain with radiation to the back, CT aorta was performed with above findings.  Patient was given aspirin for chest pain and Zofran for nausea and subsequently Dilaudid.  She was later given GI cocktail as well.  Findings discussed with the patient.  Patient reports feeling much better and is noted to be resting comfortably in no acute distress and nontoxic in appearance with unremarkable vital signs.  Low clinical suspicion for an acute serious chest pathology given the  history/exam/findings.  However, discussed with patient worrisome signs and symptoms and return to ED precautions and she was advised on need for outpatient follow-up for further evaluation.  She will be discharged home with prescription of Zofran to use as needed.  Patient verbalized understanding and agreed with plan of care with no further questions or concerns.        The patient is referred to a primary physician for blood pressure management, diabetic screening, and for all other preventative health concerns.       FINAL IMPRESSION  1. Chest pain in adult    2. Non-intractable vomiting with nausea, unspecified vomiting type    3. Acute nonintractable headache, unspecified headache type           DISPOSITION  Patient will be discharged home in stable condition.       FOLLOW UP  Mitchell Alegre M.D.  [address] [address] [phone]    Call in 2 days      [facility], Emergency Dept  [address]  [place] [id number] [phone]    If symptoms worsen         OUTPATIENT MEDICATIONS  Discharge Medication List as of 9/2/2018  3:17 AM      START taking these medications    Details   ondansetron (ZOFRAN ODT) 4 MG TABLET DISPERSIBLE Take 1 Tab by mouth every 6 hours as needed., Disp-5 Tab, R-0, Print Rx Paper                Electronically signed by: Clive Burciaga, 9/2/2018 12:31 AM      Portions of this record were made with voice recognition software.  Despite my review, spelling/grammar/context errors may still remain.  Interpretation of this chart should be taken in this context.

## 2018-09-02 NOTE — ED NOTES
Pt given discharge information and prescription, pt verbalized understanding of all information given. Pt ambulate out of the ER w/ family.

## 2018-09-03 LAB — EKG IMPRESSION: NORMAL

## 2018-09-06 ENCOUNTER — OCCUPATIONAL MEDICINE (OUTPATIENT)
Dept: OCCUPATIONAL MEDICINE | Facility: CLINIC | Age: 63
End: 2018-09-06
Payer: COMMERCIAL

## 2018-09-06 VITALS
OXYGEN SATURATION: 91 % | SYSTOLIC BLOOD PRESSURE: 118 MMHG | RESPIRATION RATE: 16 BRPM | HEART RATE: 69 BPM | HEIGHT: 67 IN | DIASTOLIC BLOOD PRESSURE: 74 MMHG | TEMPERATURE: 97.9 F

## 2018-09-06 DIAGNOSIS — S90.31XD CONTUSION OF RIGHT FOOT, SUBSEQUENT ENCOUNTER: ICD-10-CM

## 2018-09-06 PROCEDURE — 99213 OFFICE O/P EST LOW 20 MIN: CPT | Performed by: PREVENTIVE MEDICINE

## 2018-09-06 ASSESSMENT — ENCOUNTER SYMPTOMS
SENSORY CHANGE: 0
TINGLING: 0

## 2018-09-06 NOTE — PROGRESS NOTES
"Subjective:      Aicha Choudhary is a 62 y.o. female who presents with Follow-Up ([facility] DOI 7/16/2018 (R) foot pt states it still the same)      DOI 07/16/18: 63 yo female presents with right foot injury. Another employee dropped a steel bar on her foot twice.  X-rays of the right foot were negative for any acute findings.  Patient states overall symptoms are the same.  Continues to have right foot pain extending from the metatarsals to the lateral side of her foot.  Pain is worse with prolonged standing or walking.  She states she has not been taking her medications due to nausea and vomiting she has been having for the past for 5 days.     HPI    Review of Systems   Neurological: Negative for tingling and sensory change.     SOCHX: Works as a  at [employer]   FH: No pertinent family history to this problem.       Objective:     /74   Pulse 69   Temp 36.6 °C (97.9 °F)   Resp 16   Ht 1.702 m (5' 7\")   SpO2 91%      Physical Exam   Constitutional: She is oriented to person, place, and time. She appears well-developed and well-nourished.   Cardiovascular: Normal rate.    Pulmonary/Chest: Effort normal.   Neurological: She is alert and oriented to person, place, and time.   Skin: Skin is warm and dry.   Psychiatric: She has a normal mood and affect.       Right foot: Surgical scar consistent with prior surgery.  Tenderness to palpation from the first MTP to the fifth MTP.  Full range of motion of digits and ankle.  Slight antalgic gait.       Assessment/Plan:     1. Contusion of right foot, subsequent encounter  - REFERRAL TO RADIOLOGY  - MR-FOOT-W/O RIGHT; Future    Referral for MRI right foot  Recommend trying diclofenac gel  Continue restricted duty  Follow-up 3 weeks  "

## 2018-09-06 NOTE — LETTER
[address],   [address] [address]  Phone:  [phone] - Fax:  [phone]   [facility] Progress Report and Disability Certification  Date of Service: 9/6/2018   No Show:  No  Date / Time of Next Visit: 10/3/2018 l/m without making follow-up. Left a message for her to call us to schedule follow-up   Claim Information   Patient Name: Aicha Choudhary  Claim Number:     Employer: [employer]  Date of Injury: 7/16/2018     Insurer / TPA: [employer]  ID / SSN:     Occupation: PRODUCTION ASSCIATE  Diagnosis: The encounter diagnosis was Contusion of right foot, subsequent encounter.    Medical Information   Related to Industrial Injury? Yes    Subjective Complaints:  DOI 07/16/18: 63 yo female presents with right foot injury. Another employee dropped a steel bar on her foot twice.  X-rays of the right foot were negative for any acute findings.  Patient states overall symptoms are the same.  Continues to have right foot pain extending from the metatarsals to the lateral side of her foot.  Pain is worse with prolonged standing or walking.  She states she has not been taking her medications due to nausea and vomiting she has been having for the past for 5 days.   Objective Findings: Right foot: Surgical scar consistent with prior surgery.  Tenderness to palpation from the first MTP to the fifth MTP.  Full range of motion of digits and ankle.  Slight antalgic gait.   Pre-Existing Condition(s):     Assessment:   Condition Same    Status: Additional Care Required  Permanent Disability:No    Plan:      Diagnostics:      Comments:  Referral for MRI right foot  Recommend trying diclofenac gel  Continue restricted duty  Follow-up 3 weeks    Disability Information   Status: Released to Restricted Duty    From:  9/6/2018  Through: 10/3/2018 Restrictions are: Temporary   Physical Restrictions   Sitting:    Standing:    Stooping:    Bending:         Squatting:    Walking:  < or = to 6 hrs/day Climbing:  < or = to 4 hrs/day Pushing:      Pulling:    Other:    Reaching Above Shoulder (L):   Reaching Above Shoulder (R):       Reaching Below Shoulder (L):    Reaching Below Shoulder (R):      Not to exceed Weight Limits   Carrying(hrs):   Weight Limit(lb):   Lifting(hrs):   Weight  Limit(lb):     Comments:      Repetitive Actions   Hands: i.e. Fine Manipulations from Grasping:     Feet: i.e. Operating Foot Controls:     Driving / Operate Machinery:     Physician Name: Casper Sheehan D.O. Physician Signature: CASPER Hernandes D.O. e-Signature: Dr. Reed Orr, Medical Director   Clinic Name / Location: [address],   [address] [address] Clinic Phone Number: Dept: [phone]   Appointment Time: 3:25 Pm Visit Start Time: 3:12 PM   Check-In Time:  2:49 Pm Visit Discharge Time:     Original-Treating Physician or Chiropractor    Page 2-Insurer/TPA    Page 3-Employer    Page 4-Employee

## 2018-10-03 ENCOUNTER — OCCUPATIONAL MEDICINE (OUTPATIENT)
Dept: OCCUPATIONAL MEDICINE | Facility: CLINIC | Age: 63
End: 2018-10-03
Payer: COMMERCIAL

## 2018-10-03 VITALS
BODY MASS INDEX: 38.04 KG/M2 | SYSTOLIC BLOOD PRESSURE: 132 MMHG | TEMPERATURE: 98.3 F | WEIGHT: 242.4 LBS | DIASTOLIC BLOOD PRESSURE: 86 MMHG | OXYGEN SATURATION: 93 % | HEIGHT: 67 IN | HEART RATE: 84 BPM

## 2018-10-03 DIAGNOSIS — S90.31XD CONTUSION OF RIGHT FOOT, SUBSEQUENT ENCOUNTER: ICD-10-CM

## 2018-10-03 PROCEDURE — 99213 OFFICE O/P EST LOW 20 MIN: CPT | Performed by: PREVENTIVE MEDICINE

## 2018-10-03 ASSESSMENT — ENCOUNTER SYMPTOMS
SENSORY CHANGE: 0
TINGLING: 0

## 2018-10-03 NOTE — LETTER
[address],   [address] [address]  Phone:  [phone] - Fax:  [phone]   [facility] Progress Report and Disability Certification  Date of Service: 10/3/2018   No Show:  No  Date / Time of Next Visit: 10/30/2018 @ 8:15 AM   Claim Information   Patient Name: Aicha Choudhary  Claim Number:     Employer: [employer]  Date of Injury: 7/16/2018     Insurer / TPA: [employer]  ID / SSN:     Occupation: PRODUCTION ASSCIATE  Diagnosis: The encounter diagnosis was Contusion of right foot, subsequent encounter.    Medical Information   Related to Industrial Injury? No    Subjective Complaints:  DOI 07/16/18: 63 yo female presents with right foot injury. Another employee dropped a steel bar on her foot twice.  X-rays of the right foot were negative for any acute findings.  Patient states overall symptoms are the same.  Patient states that overall symptoms continue to be most prominent in the midfoot.  She continues no pain with prolonged standing and walking.  Denies any numbness or tingling.  She had her MRI performed last week.   Objective Findings: Right foot: Surgical scar consistent with prior surgery.  Tenderness to palpation from the first MTP to the fifth MTP, more so around th midfoot . full range of motion of digits and ankle.  Slight antalgic gait.    MRI Right Foot: Marrow edema anterior process of calcaneus. Moderate diffuse muscle atrophy.  Severe first metatarsophalangeal osteoarthritis.  No fracture or stress fracture.   Pre-Existing Condition(s):     Assessment:   Condition Same    Status: Additional Care Required  Permanent Disability:No    Plan:      Diagnostics:      Comments:  Referral to physical therapy  Continue restricted duty  Follow-up 3 weeks    Disability Information   Status: Released to Restricted Duty    From:  10/3/2018  Through: 10/30/2018 Restrictions are: Temporary   Physical Restrictions    Sitting:    Standing:    Stooping:    Bending:      Squatting:    Walking:  < or = to 6 hrs/day Climbing:  < or = to 4 hrs/day Pushing:      Pulling:    Other:    Reaching Above Shoulder (L):   Reaching Above Shoulder (R):       Reaching Below Shoulder (L):    Reaching Below Shoulder (R):      Not to exceed Weight Limits   Carrying(hrs):   Weight Limit(lb):   Lifting(hrs):   Weight  Limit(lb):     Comments:      Repetitive Actions   Hands: i.e. Fine Manipulations from Grasping:     Feet: i.e. Operating Foot Controls:     Driving / Operate Machinery:     Physician Name: Casper Sheehan D.O. Physician Signature: CASPER Hernandes D.O. e-Signature: Dr. Reed Orr, Medical Director   Clinic Name / Location: [address],   [address] [place] [address] Clinic Phone Number: Dept: [phone]   Appointment Time: 8:00 Am Visit Start Time: 8:17 AM   Check-In Time:  8:08 Am Visit Discharge Time: 8:45 AM   Original-Treating Physician or Chiropractor    Page 2-Insurer/TPA    Page 3-Employer    Page 4-Employee

## 2018-10-16 ENCOUNTER — OCCUPATIONAL MEDICINE (OUTPATIENT)
Dept: OCCUPATIONAL MEDICINE | Facility: CLINIC | Age: 63
End: 2018-10-16
Payer: COMMERCIAL

## 2018-10-16 VITALS
TEMPERATURE: 97.8 F | OXYGEN SATURATION: 95 % | WEIGHT: 241 LBS | HEIGHT: 67 IN | BODY MASS INDEX: 37.83 KG/M2 | DIASTOLIC BLOOD PRESSURE: 86 MMHG | SYSTOLIC BLOOD PRESSURE: 132 MMHG | HEART RATE: 86 BPM

## 2018-10-16 DIAGNOSIS — M77.41 METATARSALGIA OF RIGHT FOOT: ICD-10-CM

## 2018-10-16 DIAGNOSIS — S90.31XD CONTUSION OF RIGHT FOOT, SUBSEQUENT ENCOUNTER: ICD-10-CM

## 2018-10-16 PROCEDURE — 99213 OFFICE O/P EST LOW 20 MIN: CPT | Performed by: PREVENTIVE MEDICINE

## 2018-10-16 ASSESSMENT — ENCOUNTER SYMPTOMS
TINGLING: 0
SENSORY CHANGE: 0

## 2018-10-16 NOTE — PROGRESS NOTES
"Subjective:      Aicha Choudhary is a 62 y.o. female who presents with Foot Problem ([facility] DOI Right foot-worse RM 21)      DOI 07/16/18: 63 yo female presents with right foot injury. Another employee dropped a steel bar on her foot twice.  X-rays of the right foot were negative for any acute findings. MRI Right Foot: \"Marrow edema anterior process of calcaneus. Moderate diffuse muscle atrophy.  Severe first metatarsophalangeal osteoarthritis.  No fracture or stress fracture.\"  Patient states over the last 2 weeks symptoms have worsened.  She continues to have pain in the midfoot throughout all digits.  She notes that after her shift she had severe cramping\" pulsating\" pain in the foot.  She states she can barely walk.  She notes that she is currently changing to different pain management doctor for her unrelated musculoskeletal disease and does not have her regular oxycodone.  She has been using the diclofenac gel.  She starts physical therapy in 1 week.     HPI    Review of Systems   Neurological: Negative for tingling and sensory change.     SOCHX: Works as a  at [employer]  FH: No pertinent family history to this problem.     Objective:     /86   Pulse 86   Temp 36.6 °C (97.8 °F)   Ht 1.702 m (5' 7\")   Wt 109.3 kg (241 lb)   SpO2 95%   BMI 37.75 kg/m²      Physical Exam   Constitutional: She is oriented to person, place, and time. She appears well-developed and well-nourished.   Cardiovascular: Normal rate.    Pulmonary/Chest: Effort normal.   Neurological: She is alert and oriented to person, place, and time.   Skin: Skin is warm and dry.   Psychiatric: She has a normal mood and affect. Judgment normal.       Right foot: Surgical scar consistent with prior surgery.  Tenderness to palpation in the midfoot along the metatarsals of all digits.. full range of motion of digits and ankle.  Antalgic gait.       Assessment/Plan:     1. Contusion of right foot, subsequent encounter  - " REFERRAL TO ORTHOPEDICS  2. Metatarsalgia of right foot  - REFERRAL TO ORTHOPEDICS    Given prolonged course and worsening symptoms referral to orthopedics   Continue diclofenac gel  Begin physical therapy  Restricted duty  Follow-up 4 weeks

## 2018-10-16 NOTE — LETTER
"   [facility]  [address],   [address] [address]  Phone:  [phone] - Fax:  [phone]   [facility] Progress Report and Disability Certification  Date of Service: 10/16/2018   No Show:  No  Date / Time of Next Visit: 11/13/2018 @1:15   Claim Information   Patient Name: Aicha Choudhary  Claim Number:     Employer: [employer]  Date of Injury: 7/16/2018     Insurer / TPA: [employer]  ID / SSN:     Occupation: PRODUCTION ASSCIATE  Diagnosis: Diagnoses of Contusion of right foot, subsequent encounter and Metatarsalgia of right foot were pertinent to this visit.    Medical Information   Related to Industrial Injury? No    Subjective Complaints:  DOI 07/16/18: 61 yo female presents with right foot injury. Another employee dropped a steel bar on her foot twice.  X-rays of the right foot were negative for any acute findings. MRI Right Foot: \"Marrow edema anterior process of calcaneus. Moderate diffuse muscle atrophy.  Severe first metatarsophalangeal osteoarthritis.  No fracture or stress fracture.\"  Patient states over the last 2 weeks symptoms have worsened.  She continues to have pain in the midfoot throughout all digits.  She notes that after her shift she had severe cramping\" pulsating\" pain in the foot.  She states she can barely walk.  She notes that she is currently changing to different pain management doctor for her unrelated musculoskeletal disease and does not have her regular oxycodone.  She has been using the diclofenac gel.  She starts physical therapy in 1 week.   Objective Findings: Right foot: Surgical scar consistent with prior surgery.  Tenderness to palpation in the midfoot along the metatarsals of all digits.. full range of motion of digits and ankle.  Antalgic gait.   Pre-Existing Condition(s):     Assessment:   Condition Worsened    Status: Additional Care Required  Permanent Disability:No    Plan:      Diagnostics:      "   Comments:  Given prolonged course and worsening symptoms referral to orthopedics   Continue diclofenac gel  Begin physical therapy  Restricted duty  Follow-up 4 weeks    Disability Information   Status: Released to Restricted Duty    From:  10/16/2018  Through: 11/13/2018 Restrictions are: Temporary   Physical Restrictions   Sitting:    Standing:  < or = to 4 hrs/day Stooping:    Bending:      Squatting:    Walking:  < or = to 4 hrs/day Climbing:  < or = to 4 hrs/day Pushing:      Pulling:    Other:    Reaching Above Shoulder (L):   Reaching Above Shoulder (R):       Reaching Below Shoulder (L):    Reaching Below Shoulder (R):      Not to exceed Weight Limits   Carrying(hrs):   Weight Limit(lb):   Lifting(hrs):   Weight  Limit(lb):     Comments:      Repetitive Actions   Hands: i.e. Fine Manipulations from Grasping:     Feet: i.e. Operating Foot Controls:     Driving / Operate Machinery:     Physician Name: Casper Sheehan D.O. Physician Signature: CASPER Hernandes D.O. e-Signature: Dr. Reed Orr, Medical Director   Clinic Name / Location: [address],   [address] [place] [address] Clinic Phone Number: Dept: [phone]   Appointment Time: 1:15 Pm Visit Start Time: 12:56 PM   Check-In Time:  12:47 Pm Visit Discharge Time: 1:28 PM   Original-Treating Physician or Chiropractor    Page 2-Insurer/TPA    Page 3-Employer    Page 4-Employee

## 2018-10-19 ENCOUNTER — APPOINTMENT (OUTPATIENT)
Dept: PHYSICAL THERAPY | Facility: REHABILITATION | Age: 63
End: 2018-10-19
Attending: PREVENTIVE MEDICINE
Payer: COMMERCIAL

## 2018-10-24 ENCOUNTER — PHYSICAL THERAPY (OUTPATIENT)
Dept: PHYSICAL THERAPY | Facility: REHABILITATION | Age: 63
End: 2018-10-24
Attending: PREVENTIVE MEDICINE
Payer: COMMERCIAL

## 2018-10-24 DIAGNOSIS — M54.16 LUMBAR RADICULOPATHY, CHRONIC: ICD-10-CM

## 2018-10-24 DIAGNOSIS — S90.31XD CONTUSION OF FOOT INCLUDING TOES, RIGHT, SUBSEQUENT ENCOUNTER: ICD-10-CM

## 2018-10-24 DIAGNOSIS — S90.121D CONTUSION OF FOOT INCLUDING TOES, RIGHT, SUBSEQUENT ENCOUNTER: ICD-10-CM

## 2018-10-24 PROCEDURE — 97162 PT EVAL MOD COMPLEX 30 MIN: CPT

## 2018-10-24 PROCEDURE — 97014 ELECTRIC STIMULATION THERAPY: CPT

## 2018-10-24 ASSESSMENT — ENCOUNTER SYMPTOMS
PAIN SCALE AT LOWEST: 3
PAIN SCALE: 4
QUALITY: BURNING
ALLEVIATING FACTORS: PAIN MEDICATION
EXACERBATED BY: ACTIVITY
PAIN SCALE AT HIGHEST: 6
QUALITY: CRAMPING
QUALITY: ACHING
QUALITY: DEEP
PAIN TIMING: CONSTANT

## 2018-10-24 ASSESSMENT — ACTIVITIES OF DAILY LIVING (ADL)
POOR_BALANCE: 1
AMBULATION_WITHOUT_ASSISTIVE_DEVICE: INDEPENDENT

## 2018-10-24 NOTE — OP THERAPY EVALUATION
Outpatient Physical Therapy  INITIAL EVALUATION    [facility] [address].  [address]  [place] [address]  Phone:  [phone]  Fax:  [phone]    Date of Evaluation: 10/24/2018    Patient: Aicha Choudhary  YOB: 1955  MRN: [id number]     Referring Provider: Casper Sheehan D.O.  [address] [place] [address] [address]  [place] [address]   Referring Diagnosis Contusion of right foot, subsequent encounter [S90.31XD]     Time Calculation  Start time: 0835  Stop time: 0945 Time Calculation (min): 70 minutes     Physical Therapy Occurrence Codes    Date of onset of impairment:  7/16/18   Date physical therapy care plan established or reviewed:  10/24/18   Date physical therapy treatment started:  10/24/18          Chief Complaint: No chief complaint on file.    Visit Diagnoses     ICD-10-CM   1. Contusion of foot including toes, right, subsequent encounter S90.31XD    S90.121D   2. Lumbar radiculopathy, chronic M54.16         Subjective:   History of Present Illness:     Mechanism of injury:  Aicha is a 62 year old female who works at [employer]. Her injury occurred on 07/16/18 secondary to steel mohan being dropped on patient's foot by another employee x2. First time, the mohan hit her steel toe shoe, but 2nd time injury was a dorsal aspect of foot. Patient has been off work until 10/06/18. During that time she was not using any additional AD which restriction to limit walking. Patient worked for 3 days, and then was unable to continue due to foot cramping and  increased swelling.     Patient currently works at [employer] 3 x 12 hour shifts per week where she is standing throughout her full shift.     Patient reports at this time the aggravating sxs occur standing > 1 hour, walking > 20 minutes, going up/down stairs. (at work she has to manage 3 flights of stairs)   Headaches:  no headaches  Sleep disturbance:  Interrupted sleep (prior to injury patient report poor sleep quality, and taking  ambien )  Pain:     Current pain ratin    At best pain rating:  3    At worst pain ratin (in the last week)    Pain location: metatarsal to talus area.    Quality:  Cramping, deep, aching and burning    Pain timing:  Constant    Relieving factors:  Pain medication    Aggravating factors:  Activity    Pain Comments::  Pain was worse returning back to work   Social Support:     Lives in:  Multiple-level home    Lives with:  Alone  Diagnostic Tests:     X-ray: normal      Diagnostic Tests Comments:  MRI 2018: moderate diffuse muscle atrophy and prominence of anterior calcaneus.  Treatments:     Previous treatment:  Medication  Patient Goals:     Other patient goals:  To be able to return to work with no restrictions.       Past Medical History:   Diagnosis Date   • Depression    • Hx of hysterectomy      Past Surgical History:   Procedure Laterality Date   • HERNIA REPAIR     • OTHER  left knee surgery     Social History   Substance Use Topics   • Smoking status: Former Smoker     Quit date: 2008   • Smokeless tobacco: Never Used   • Alcohol use Yes      Comment: occasional     Family and Occupational History     Social History   • Marital status:      Spouse name: N/A   • Number of children: N/A   • Years of education: N/A       Objective     Neurological Testing     Sensation     Ankle/Foot   Left Ankle/Foot   Intact: light touch    Right Ankle/Foot   Intact: light touch     Reflexes   Left   Patellar (L4): trace (1+)  Achilles (S1): trace (1+)  Clonus sign: negative    Right   Patellar (L4): trace (1+)  Achilles (S1): trace (1+)    Additional Neurological Details  Clonus negative on L, unable to accurately access right side due to patient reporting pain     Palpation   Left   No palpable tenderness to the anterior tibialis, lateral gastrocnemius, medial gastrocnemius, posterior tibialis and soleus.     Right   No palpable tenderness to the anterior tibialis, lateral gastrocnemius, medial  gastrocnemius, posterior tibialis and soleus.     Additional Palpation Details  Overall, patient is very sensitive to all palpation of dorsal side of foot, more lateral than medial     Active Range of Motion   Left Ankle/Foot   Dorsiflexion (kf): WFL  Plantar flexion: WFL    Right Ankle/Foot   Dorsiflexion (ke): 0 degrees with pain  Plantar flexion: WFL    Joint Play     Right Ankle/Foot  Hypomobile in the distal tibiofibular joint, talocrural joint, subtalar joint, midfoot and forefoot.     Additional Joint Play Details  Reports pain with all mobilization testing     Strength:      Left Ankle/Foot   Dorsiflexion: 5  Plantar flexion: 5  Inversion: 5  Eversion: 5    Right Ankle/Foot   Dorsiflexion: 4+ (pain )  Plantar flexion: 4+ (pain )  Inversion: 4+ (pain )  Eversion: 4 (pain )  Great toe flexion: 4 (pain )  Great toe extension: 4 (pain)    Additional Strength Details  Able to complete 10 reps of heel lifts     Tests     Right Ankle/Foot   Negative for anterior drawer.     Additional Tests Details  + tib/fib distal mobilization   Ambulation     Ambulation: Level Surfaces   Ambulation without assistive device: independent    Observational Gait   Stride length, left stance time, right stance time, left step length and right step length within functional limits. Decreased walking speed. Stride width: WFL.    Left foot contact pattern: heel to toe  Right foot contact pattern: heel to toe  Right arm swing: decreased  Base of support: increased    Additional Observational Gait Details  Decreased intermittent stance time on R side compared to L. Trialed use of SPC, patient demonstrated decreased stance time on R side and decreased step length.    Quality of Movement During Gait     Pelvis    Pelvis (Right): Positive retracted.     Additional Quality of Movement During Gait Details  Decreased anterolateral WS to the R with stance     General Comments     Spine Comments   Prone press up x 10, patient reports worsening sxs  into R toe         Therapeutic Exercises (CPT 85269):     1. Ankle ROM/mobility , handout provided     2. Lying prone , handout provided     Therapeutic Treatments and Modalities:     1. E Stim Unattended (CPT 47078), IFC and P lumbar spine and R ankle , patient reports no change with sxs    Time-based treatments/modalities:          Assessment, Response and Plan:   Impairments: abnormal or restricted ROM, activity intolerance, impaired balance, lacks appropriate home exercise program and pain with function    Assessment details:  Patient is a 62 year old female s/p work injury in July with mohan falling on patient foot. Patient presents with abnormal gait, impaired R ankle ROM, hypomobility of R foot multiple joints, and pain limiting functional mobility. During testing patient demonstrated functional mobility that did not match patient  reported sxs. Patient did report lumbar positioning did aggravate sxs into R 1st toe. A lumbar screen may also be beneficial to rule in/out sxs and provide optimal care.   Barriers to therapy:  None  Goals:   Short Term Goals:   1) Patient will report ability to walk >15 minutes without sxs  2) Patient will report ability to negotiate stairs in home with 50% improvement of sxs.    Short term goal time span:  2-4 weeks      Long Term Goals:    1) Patient will be independent with HEP  2) Patient will improve WOMAC > 50%   3) Patient reports walking > 30 minutes without sxs  Long term goal time span:  1-2 months    Plan:   Therapy options:  Physical therapy treatment to continue  Planned therapy interventions:  Neuromuscular Re-education (CPT 84003), Gait Training (CPT 75794), E Stim Unattended (CPT 63585), Therapeutic Exercise (CPT 83750) and Manual Therapy (CPT 42032)  Frequency: 1-2x a week.  Duration in weeks:  8  Duration in visits:  12  Discussed with:  Patient  Plan details:  Patient to trial initial 6 sessions, if no improvement refer back to MD       Functional Limitation  G-Codes and Severity Modifiers  WOMAC Grand Total: 37.5   Current:   n/a   Goal:   n/a     Referring provider co-signature:  I have reviewed this plan of care and my co-signature certifies the need for services.  Certification Dates:   From 10/24/2018       To 12/28/18    Physician Signature: ________________________________ Date: ______________

## 2018-10-26 ENCOUNTER — PHYSICAL THERAPY (OUTPATIENT)
Dept: PHYSICAL THERAPY | Facility: MEDICAL CENTER | Age: 63
End: 2018-10-26
Attending: PREVENTIVE MEDICINE
Payer: COMMERCIAL

## 2018-10-26 DIAGNOSIS — S90.31XD CONTUSION OF FOOT INCLUDING TOES, RIGHT, SUBSEQUENT ENCOUNTER: ICD-10-CM

## 2018-10-26 DIAGNOSIS — S90.121D CONTUSION OF FOOT INCLUDING TOES, RIGHT, SUBSEQUENT ENCOUNTER: ICD-10-CM

## 2018-10-26 PROCEDURE — 97014 ELECTRIC STIMULATION THERAPY: CPT

## 2018-10-26 PROCEDURE — 97110 THERAPEUTIC EXERCISES: CPT

## 2018-10-26 PROCEDURE — 97140 MANUAL THERAPY 1/> REGIONS: CPT

## 2018-10-26 NOTE — OP THERAPY DAILY TREATMENT
Outpatient Physical Therapy  DAILY TREATMENT     [facility]  [address]  [place] [address]  Phone:  [phone]  Fax:  [phone]    Date: 10/26/2018    Patient: Aicha Choudhary  YOB: 1955  MRN: [id number]     Time Calculation  Start time: 1030  Stop time: 1115 Time Calculation (min): 45 minutes     Chief Complaint: Foot Problem; Difficulty Walking; and Ankle Problem    Visit #: 2    SUBJECTIVE:  Patient seen for follow up on after eval for contusion of her foot. She was evaluted at another clinic. She walks with antalgic gait and is very sensitive to touch and motion on all directions   OBJECTIVE:  Current objective measures:           Therapeutic Exercises (CPT 28036):     1. Foot mojo protocol, See below       Therapeutic Exercise Summary: 1. Roll foot on ball  2. Splay toes  3. Big toe up/little toes down  4. Big toe wall ball stretch  5. Single leg stance.     Therapeutic Treatments and Modalities:     1. Manual Therapy (CPT 84718), R foot, Gentle IASTM and foot and ankle mobs     2. E Stim Unattended (CPT 77749), R foot, IFC and heat to R foot/ankle     Time-based treatments/modalities:  Manual therapy minutes (CPT 34557): 15 minutes  Therapeutic exercise minutes (CPT 94529): 15 minutes       ASSESSMENT:   Response to treatment: Worked on education regarding foot use and desentization and early mobility. It has been some time since her injury, imaging shows no major damage. Eduardo has self limiting behavior but with some real pain and symptoms      PLAN/RECOMMENDATIONS:   Plan for treatment: therapy treatment to continue next visit.  Planned interventions for next visit: continue with current treatment.

## 2018-10-29 ENCOUNTER — PHYSICAL THERAPY (OUTPATIENT)
Dept: PHYSICAL THERAPY | Facility: MEDICAL CENTER | Age: 63
End: 2018-10-29
Attending: PREVENTIVE MEDICINE
Payer: COMMERCIAL

## 2018-10-29 DIAGNOSIS — M54.16 LUMBAR RADICULOPATHY, CHRONIC: ICD-10-CM

## 2018-10-29 DIAGNOSIS — S90.31XD CONTUSION OF FOOT INCLUDING TOES, RIGHT, SUBSEQUENT ENCOUNTER: ICD-10-CM

## 2018-10-29 DIAGNOSIS — S90.121D CONTUSION OF FOOT INCLUDING TOES, RIGHT, SUBSEQUENT ENCOUNTER: ICD-10-CM

## 2018-10-29 PROCEDURE — 97140 MANUAL THERAPY 1/> REGIONS: CPT

## 2018-10-29 PROCEDURE — 97110 THERAPEUTIC EXERCISES: CPT

## 2018-10-29 PROCEDURE — 97014 ELECTRIC STIMULATION THERAPY: CPT

## 2018-10-29 NOTE — OP THERAPY DAILY TREATMENT
Outpatient Physical Therapy  DAILY TREATMENT     [facility] Out[facility]  [address]  [place] [address]  Phone:  [phone]  Fax:  [phone]    Date: 10/29/2018    Patient: Aicha Choudhary  YOB: 1955  MRN: [id number]     Time Calculation  Start time: 1030  Stop time: 1115 Time Calculation (min): 45 minutes     Chief Complaint: Difficulty Walking; Foot Problem; and Ankle Problem    Visit #: 3    SUBJECTIVE:  Patient seen for follow up on after  for contusion of her foot. She had a good weekend and took the dogs for short walk maybe 20 min walk with no increase in foot pain.    OBJECTIVE:  Current objective measures:           Therapeutic Exercises (CPT 92863):     1. Nu step, Level 10 x 5 mins     2. Ankle rocker board, 4 directions, 10-15 circles each direction     3. Gastroc stretch, board      Therapeutic Exercise Summary: 1. Roll foot on ball  2. Splay toes  3. Big toe up/little toes down  4. Big toe wall ball stretch  5. Single leg stance.     Therapeutic Treatments and Modalities:     1. Manual Therapy (CPT 57938), R foot, Gentle IASTM and foot and ankle mobs, Scientologist floss with ABC's     2. E Stim Unattended (CPT 10309), R foot, IFC and heat to R foot/ankle     Time-based treatments/modalities:  Manual therapy minutes (CPT 95744): 15 minutes  Therapeutic exercise minutes (CPT 73891): 15 minutes       ASSESSMENT:   Response to treatment: Did well with work today gentle loading today. Will keep up progressing as able.     PLAN/RECOMMENDATIONS:   Plan for treatment: therapy treatment to continue next visit.  Planned interventions for next visit: continue with current treatment.

## 2018-10-31 ENCOUNTER — APPOINTMENT (OUTPATIENT)
Dept: PHYSICAL THERAPY | Facility: MEDICAL CENTER | Age: 63
End: 2018-10-31
Attending: PREVENTIVE MEDICINE
Payer: COMMERCIAL

## 2018-11-02 ENCOUNTER — APPOINTMENT (OUTPATIENT)
Dept: PHYSICAL THERAPY | Facility: REHABILITATION | Age: 63
End: 2018-11-02
Attending: PREVENTIVE MEDICINE
Payer: COMMERCIAL

## 2018-11-02 ENCOUNTER — PHYSICAL THERAPY (OUTPATIENT)
Dept: PHYSICAL THERAPY | Facility: MEDICAL CENTER | Age: 63
End: 2018-11-02
Attending: PREVENTIVE MEDICINE
Payer: COMMERCIAL

## 2018-11-02 DIAGNOSIS — M54.16 LUMBAR RADICULOPATHY, CHRONIC: ICD-10-CM

## 2018-11-02 DIAGNOSIS — S90.31XD CONTUSION OF FOOT INCLUDING TOES, RIGHT, SUBSEQUENT ENCOUNTER: ICD-10-CM

## 2018-11-02 DIAGNOSIS — S90.121D CONTUSION OF FOOT INCLUDING TOES, RIGHT, SUBSEQUENT ENCOUNTER: ICD-10-CM

## 2018-11-02 PROCEDURE — 97140 MANUAL THERAPY 1/> REGIONS: CPT

## 2018-11-02 PROCEDURE — 97110 THERAPEUTIC EXERCISES: CPT

## 2018-11-02 PROCEDURE — 97014 ELECTRIC STIMULATION THERAPY: CPT

## 2018-11-02 NOTE — OP THERAPY DAILY TREATMENT
Outpatient Physical Therapy  DAILY TREATMENT     [facility]  [address]  [place] [address]  Phone:  [phone]  Fax:  [phone]    Date: 11/02/2018    Patient: Aicha Choudhary  YOB: 1955  MRN: [id number]     Time Calculation             Chief Complaint: Ankle Injury; Ankle Problem; and Foot Problem    Visit #: 4    SUBJECTIVE:  Patient seen for follow up on after  for contusion of her foot. She had a rough day yesterday as she tried to run some errands (grocery store, dog for a walk etc) She was only out for 2-3 hrs and her foot became very painful. She has antalgic limp entering today.   OBJECTIVE:  Current objective measures:           Therapeutic Exercises (CPT 33103):     1. Nu step, Level 5 x 7 mins     2. Ankle Rocker board , 4 directions, 10-15 circles each direction     3. Gastroc stretch, board, 10 -15 pulses (2-3 sec each)      Therapeutic Exercise Summary: Foot mojo given for HEP   1. Roll foot on ball  2. Splay toes  3. Big toe up/little toes down  4. Big toe wall ball stretch  5. Single leg stance.     Therapeutic Treatments and Modalities:     1. Manual Therapy (CPT 20544), R foot, Gentle IASTM and foot and ankle mobs, Shinto floss with ABC's     2. E Stim Unattended (CPT 19238), R foot, IFC and heat to R foot/ankle     Time-based treatments/modalities:         ASSESSMENT:   Response to treatment: Want to get her to do more active loading and weightbearing but her pain is a limiting factor. Will continue to progress as able. She is stressed about money (lack of) this could be contributing factor is her pain as well. Talked about breathing exercises and trying to calm herself.     PLAN/RECOMMENDATIONS:   Plan for treatment: therapy treatment to continue next visit.  Planned interventions for next visit: continue with current treatment.

## 2018-11-05 ENCOUNTER — APPOINTMENT (OUTPATIENT)
Dept: PHYSICAL THERAPY | Facility: MEDICAL CENTER | Age: 63
End: 2018-11-05
Attending: PREVENTIVE MEDICINE
Payer: COMMERCIAL

## 2018-11-07 ENCOUNTER — APPOINTMENT (OUTPATIENT)
Dept: PHYSICAL THERAPY | Facility: MEDICAL CENTER | Age: 63
End: 2018-11-07
Attending: PREVENTIVE MEDICINE
Payer: COMMERCIAL

## 2018-11-08 ENCOUNTER — TELEPHONE (OUTPATIENT)
Dept: OCCUPATIONAL MEDICINE | Facility: CLINIC | Age: 63
End: 2018-11-08

## 2018-11-09 ENCOUNTER — OCCUPATIONAL MEDICINE (OUTPATIENT)
Dept: OCCUPATIONAL MEDICINE | Facility: CLINIC | Age: 63
End: 2018-11-09
Payer: COMMERCIAL

## 2018-11-09 ENCOUNTER — APPOINTMENT (OUTPATIENT)
Dept: PHYSICAL THERAPY | Facility: REHABILITATION | Age: 63
End: 2018-11-09
Attending: PREVENTIVE MEDICINE
Payer: COMMERCIAL

## 2018-11-09 VITALS
BODY MASS INDEX: 38.92 KG/M2 | HEART RATE: 55 BPM | WEIGHT: 248 LBS | OXYGEN SATURATION: 94 % | DIASTOLIC BLOOD PRESSURE: 76 MMHG | SYSTOLIC BLOOD PRESSURE: 142 MMHG | TEMPERATURE: 98.8 F | HEIGHT: 67 IN

## 2018-11-09 DIAGNOSIS — M77.41 METATARSALGIA OF RIGHT FOOT: ICD-10-CM

## 2018-11-09 DIAGNOSIS — S90.31XD CONTUSION OF RIGHT FOOT, SUBSEQUENT ENCOUNTER: ICD-10-CM

## 2018-11-09 PROCEDURE — 99213 OFFICE O/P EST LOW 20 MIN: CPT | Performed by: PREVENTIVE MEDICINE

## 2018-11-09 ASSESSMENT — ENCOUNTER SYMPTOMS
TINGLING: 0
SENSORY CHANGE: 0

## 2018-11-09 NOTE — LETTER
"   [place] [address] [facility],   [address] [address]  Phone:  [phone] - Fax:  [phone]   [facility] Progress Report and Disability Certification  Date of Service: 11/9/2018   No Show:  No  Date / Time of Next Visit: 11/30/2018 8:45AM   Claim Information   Patient Name: Aicha Choudhary  Claim Number:     Employer: [employer]  Date of Injury: 7/16/2018     Insurer / TPA: [employer]  ID / SSN:     Occupation: PRODUCTION ASSCIATE  Diagnosis: Diagnoses of Metatarsalgia of right foot and Contusion of right foot, subsequent encounter were pertinent to this visit.    Medical Information   Related to Industrial Injury? No    Subjective Complaints:  DOI 07/16/18: 63 yo female presents with right foot injury. Another employee dropped a steel bar on her foot twice.  X-rays of the right foot were negative for any acute findings. MRI Right Foot: \"Marrow edema anterior process of calcaneus. Moderate diffuse muscle atrophy.  Severe first metatarsophalangeal osteoarthritis.  No fracture or stress fracture.\"  Patient states that overall pain is about the same.  Pain continues to be the forefoot area.  Pain with prolonged standing or walking.  She is put on different medication including oxycodone, Relafen, Lyrica by her pain doctor for her non-work-related left foot injury.  Referred orthopedics has been approved and scheduled with a one-time visit with Dr. Gimenez on 11/28.  She has been attending physical therapy with minimal improvement.  Work is no longer accommodating restrictions.   Objective Findings: Right foot: Surgical scar consistent with prior surgery.  Tenderness to light in the midfoot along the metatarsals of all digits. full range of motion of digits and ankle.  Antalgic gait.   Pre-Existing Condition(s):     Assessment:   Condition Same    Status: Additional Care Required  Permanent Disability:No    Plan:      Diagnostics:      Comments:  " Continue physical therapy  Continue medications per pain management  Keep appointment with orthopedics  Restricted duty  Follow-up 3 weeks.    Disability Information   Status: Released to Restricted Duty    From:  11/9/2018  Through: 11/30/2018 Restrictions are: Temporary   Physical Restrictions   Sitting:    Standing:  < or = to 4 hrs/day Stooping:    Bending:      Squatting:    Walking:  < or = to 4 hrs/day Climbing:  < or = to 4 hrs/day Pushing:      Pulling:    Other:    Reaching Above Shoulder (L):   Reaching Above Shoulder (R):       Reaching Below Shoulder (L):    Reaching Below Shoulder (R):      Not to exceed Weight Limits   Carrying(hrs):   Weight Limit(lb):   Lifting(hrs):   Weight  Limit(lb):     Comments:      Repetitive Actions   Hands: i.e. Fine Manipulations from Grasping:     Feet: i.e. Operating Foot Controls:     Driving / Operate Machinery:     Physician Name: Casper Sheehan D.O. Physician Signature: CASPER Hernandes D.O. e-Signature: Dr. Reed Orr, Medical Director   Clinic Name / Location: [address],   [address] [place] [address] Clinic Phone Number: Dept: [phone]   Appointment Time: 1:15 Pm Visit Start Time: 1:24 PM   Check-In Time:  1:12 Pm Visit Discharge Time: 1:58 PM   Original-Treating Physician or Chiropractor    Page 2-Insurer/TPA    Page 3-Employer    Page 4-Employee

## 2018-11-09 NOTE — PROGRESS NOTES
"Subjective:      Aicha Choudhary is a 62 y.o. female who presents with Follow-Up (WC DOI: 07/16/18 Right foot -same-)      DOI 07/16/18: 63 yo female presents with right foot injury. Another employee dropped a steel bar on her foot twice.  X-rays of the right foot were negative for any acute findings. MRI Right Foot: \"Marrow edema anterior process of calcaneus. Moderate diffuse muscle atrophy.  Severe first metatarsophalangeal osteoarthritis.  No fracture or stress fracture.\"     HPI    Review of Systems   Skin: Negative for rash.   Neurological: Negative for tingling and sensory change.     SOCHX: Works as a  at [employer]  FH: No pertinent family history to this problem.     Objective:     /76 (BP Location: Left arm, Patient Position: Sitting)   Pulse (!) 55   Temp 37.1 °C (98.8 °F)   Ht 1.702 m (5' 7\")   Wt 112.5 kg (248 lb)   SpO2 94%   BMI 38.84 kg/m²      Physical Exam   Constitutional: She is oriented to person, place, and time. She appears well-developed and well-nourished.   Cardiovascular: Normal rate.    Pulmonary/Chest: Effort normal.   Neurological: She is alert and oriented to person, place, and time.   Skin: Skin is warm and dry.   Psychiatric: She has a normal mood and affect. Judgment normal.       Right foot: Surgical scar consistent with prior surgery.  Tenderness to palpation in the midfoot along the metatarsals of all digits.. full range of motion of digits and ankle.  Antalgic gait.       Assessment/Plan:     1. Metatarsalgia of right foot  2. Contusion of right foot, subsequent encounter    At this point her pain complaints have remained unchanged over 4-month, even with physical therapy and opiate medication from pain management for non-work-related condition.  MRI does not show any acute process which could be causing the symptoms.  May indeed be developing CPRS.  We will one-time consult with orthopedics, after which if no further recommendations are made " case will likely be closed.  Any contusion to the foot should have resolved by this point.    Continue physical therapy  Continue medications per pain management  Keep appointment with orthopedics  Restricted duty  Follow-up 3 weeks.

## 2018-11-13 ENCOUNTER — PHYSICAL THERAPY (OUTPATIENT)
Dept: PHYSICAL THERAPY | Facility: MEDICAL CENTER | Age: 63
End: 2018-11-13
Attending: PREVENTIVE MEDICINE
Payer: COMMERCIAL

## 2018-11-13 DIAGNOSIS — S90.121D CONTUSION OF FOOT INCLUDING TOES, RIGHT, SUBSEQUENT ENCOUNTER: ICD-10-CM

## 2018-11-13 DIAGNOSIS — M54.16 LUMBAR RADICULOPATHY, CHRONIC: ICD-10-CM

## 2018-11-13 DIAGNOSIS — S90.31XD CONTUSION OF FOOT INCLUDING TOES, RIGHT, SUBSEQUENT ENCOUNTER: ICD-10-CM

## 2018-11-13 PROCEDURE — 97140 MANUAL THERAPY 1/> REGIONS: CPT

## 2018-11-13 PROCEDURE — 97110 THERAPEUTIC EXERCISES: CPT

## 2018-11-13 PROCEDURE — 97014 ELECTRIC STIMULATION THERAPY: CPT

## 2018-11-13 NOTE — OP THERAPY DAILY TREATMENT
Outpatient Physical Therapy  DAILY TREATMENT     [facility] Outpatient Physical Therapy  [address]  [place] [address]  Phone:  [phone]  Fax:  [phone]    Date: 11/13/2018    Patient: Aicha Choudhary  YOB: 1955  MRN: [id number]     Time Calculation  Start time: 0900  Stop time: 0945 Time Calculation (min): 45 minutes     Chief Complaint: Difficulty Walking; Fall; and Loss Of Balance    Visit #: 5    SUBJECTIVE:  Patient seen for follow up on after  for contusion of her foot. She has been having some sick with medication that she was on but is better now    OBJECTIVE:  Current objective measures:           Therapeutic Exercises (CPT 36693):     1. Nu step, Level 5 x 7 mins     2. Ankle Rocker board , 4 directions, 10-15 circles each direction     3. Standing gastroc stretch, Use of small rocker board    4. Standing on tandem stance , use of half foam roller      Therapeutic Exercise Summary: Foot mojo given for HEP   1. Roll foot on ball  2. Splay toes  3. Big toe up/little toes down  4. Big toe wall ball stretch  5. Single leg stance.     Therapeutic Treatments and Modalities:     1. Manual Therapy (CPT 77273), R foot, Gentle IASTM and foot and ankle mobs, Gnosticist floss with ABC's     2. E Stim Unattended (CPT 57924), R foot, IFC and heat to R foot/ankle     Time-based treatments/modalities:  Manual therapy minutes (CPT 68145): 15 minutes  Therapeutic exercise minutes (CPT 96423): 15 minutes       ASSESSMENT:   Response to treatment: Want to get her to do more active loading and weightbearing but her pain is a limiting factor. Will continue to progress as able.     PLAN/RECOMMENDATIONS:   Plan for treatment: therapy treatment to continue next visit.  Planned interventions for next visit: continue with current treatment.

## 2018-11-16 ENCOUNTER — APPOINTMENT (OUTPATIENT)
Dept: PHYSICAL THERAPY | Facility: REHABILITATION | Age: 63
End: 2018-11-16
Attending: PREVENTIVE MEDICINE
Payer: COMMERCIAL

## 2018-11-16 ENCOUNTER — PHYSICAL THERAPY (OUTPATIENT)
Dept: PHYSICAL THERAPY | Facility: MEDICAL CENTER | Age: 63
End: 2018-11-16
Attending: PREVENTIVE MEDICINE
Payer: COMMERCIAL

## 2018-11-16 DIAGNOSIS — S90.31XD CONTUSION OF FOOT INCLUDING TOES, RIGHT, SUBSEQUENT ENCOUNTER: ICD-10-CM

## 2018-11-16 DIAGNOSIS — S90.121D CONTUSION OF FOOT INCLUDING TOES, RIGHT, SUBSEQUENT ENCOUNTER: ICD-10-CM

## 2018-11-16 DIAGNOSIS — M54.16 LUMBAR RADICULOPATHY, CHRONIC: ICD-10-CM

## 2018-11-16 PROCEDURE — 97140 MANUAL THERAPY 1/> REGIONS: CPT

## 2018-11-16 PROCEDURE — 97014 ELECTRIC STIMULATION THERAPY: CPT

## 2018-11-16 PROCEDURE — 97110 THERAPEUTIC EXERCISES: CPT

## 2018-11-16 NOTE — OP THERAPY DAILY TREATMENT
Outpatient Physical Therapy  DAILY TREATMENT     [facility] Outpatient Physical Therapy  [address]  [place] [address]  Phone:  [phone]  Fax:  [phone]    Date: 11/16/2018    Patient: Aicha Choudhary  YOB: 1955  MRN: [id number]     Time Calculation             Chief Complaint: Difficulty Walking; Fall; and Loss Of Balance    Visit #: 6    SUBJECTIVE:  Patient seen for follow up on after  for contusion of her foot. She has been having some sick with medication that she was on but is better now    OBJECTIVE:  Current objective measures:           Therapeutic Exercises (CPT 96734):     1. Nu step, Level 5 x 7 mins     2. Gastroc stretch/ rocker board    3. Sport cord , 1 band x 5 reps each/ 4 directions     4. Funny walks with double arm support , Tip toe, ER/IR x 25ft       Therapeutic Exercise Summary: Foot mojo given for HEP   1. Roll foot on ball  2. Splay toes  3. Big toe up/little toes down  4. Big toe wall ball stretch  5. Single leg stance.     Therapeutic Treatments and Modalities:     1. Manual Therapy (CPT 96449), R foot, Gentle IASTM and foot and ankle mobs, Restorationism floss with ABC's     2. E Stim Unattended (CPT 69566), R foot, IFC and heat to R foot/ankle     Time-based treatments/modalities:          ASSESSMENT:   Response to treatment: Want to get her to do more active loading and weightbearing but her pain is a limiting factor. Will continue to progress as able.     PLAN/RECOMMENDATIONS:   Plan for treatment: therapy treatment to continue next visit.  Planned interventions for next visit: continue with current treatment.

## 2018-11-21 ENCOUNTER — APPOINTMENT (OUTPATIENT)
Dept: PHYSICAL THERAPY | Facility: MEDICAL CENTER | Age: 63
End: 2018-11-21
Payer: COMMERCIAL

## 2018-11-29 ENCOUNTER — PHYSICAL THERAPY (OUTPATIENT)
Dept: PHYSICAL THERAPY | Facility: MEDICAL CENTER | Age: 63
End: 2018-11-29
Attending: PREVENTIVE MEDICINE
Payer: COMMERCIAL

## 2018-11-29 DIAGNOSIS — M54.16 LUMBAR RADICULOPATHY, CHRONIC: ICD-10-CM

## 2018-11-29 DIAGNOSIS — S90.31XD CONTUSION OF FOOT INCLUDING TOES, RIGHT, SUBSEQUENT ENCOUNTER: ICD-10-CM

## 2018-11-29 DIAGNOSIS — S90.121D CONTUSION OF FOOT INCLUDING TOES, RIGHT, SUBSEQUENT ENCOUNTER: ICD-10-CM

## 2018-11-29 PROCEDURE — 97014 ELECTRIC STIMULATION THERAPY: CPT

## 2018-11-29 PROCEDURE — 97110 THERAPEUTIC EXERCISES: CPT

## 2018-11-29 PROCEDURE — 97140 MANUAL THERAPY 1/> REGIONS: CPT

## 2018-11-29 NOTE — OP THERAPY DAILY TREATMENT
Outpatient Physical Therapy  DAILY TREATMENT     [facility] Outpatient Physical Therapy  [address]  [place] [address]  Phone:  [phone]  Fax:  [phone]    Date: 11/29/2018    Patient: Aicha Choudhary  YOB: 1955  MRN: [id number]     Time Calculation  Start time: 0930  Stop time: 1015 Time Calculation (min): 45 minutes     Chief Complaint: Difficulty Walking and Work-Related Injury    Visit #: 7    SUBJECTIVE:  Patient seen for follow up on after  for contusion of her foot. She had a follow up with Dr. Gimenez who reports she has some orthopedic issues/broke bone on the foot   OBJECTIVE:  Current objective measures:           Therapeutic Exercises (CPT 66013):     1. Nu step, Level 5 x 7 mins     2. Gastroc stretch/ rocker board    3. Sport cord , 1 band x 5 reps each/ 4 directions       Therapeutic Exercise Summary: Foot mojo given for HEP   1. Roll foot on ball  2. Splay toes  3. Big toe up/little toes down  4. Big toe wall ball stretch  5. Single leg stance.     Therapeutic Treatments and Modalities:     1. Manual Therapy (CPT 21265), R foot, Gentle IASTM and foot and ankle mobs, Jehovah's witness floss with ABC's     2. E Stim Unattended (CPT 00052), R foot, IFC and heat to R foot/ankle     Time-based treatments/modalities:  Manual therapy minutes (CPT 67104): 15 minutes  Therapeutic exercise minutes (CPT 51008): 15 minutes       ASSESSMENT:   Response to treatment: Want to get her to do more active loading and weightbearing but her pain is a limiting factor. Will continue to progress as able. We will get a new script regarding her foot. Will take range and update goals as able next visit     PLAN/RECOMMENDATIONS:   Plan for treatment: therapy treatment to continue next visit.  Planned interventions for next visit: continue with current treatment.

## 2018-11-30 ENCOUNTER — APPOINTMENT (OUTPATIENT)
Dept: PHYSICAL THERAPY | Facility: REHABILITATION | Age: 63
End: 2018-11-30
Attending: PREVENTIVE MEDICINE
Payer: COMMERCIAL

## 2018-12-04 ENCOUNTER — PHYSICAL THERAPY (OUTPATIENT)
Dept: PHYSICAL THERAPY | Facility: MEDICAL CENTER | Age: 63
End: 2018-12-04
Attending: PREVENTIVE MEDICINE
Payer: COMMERCIAL

## 2018-12-04 DIAGNOSIS — S90.121D CONTUSION OF FOOT INCLUDING TOES, RIGHT, SUBSEQUENT ENCOUNTER: ICD-10-CM

## 2018-12-04 DIAGNOSIS — S90.31XD CONTUSION OF FOOT INCLUDING TOES, RIGHT, SUBSEQUENT ENCOUNTER: ICD-10-CM

## 2018-12-04 DIAGNOSIS — M54.16 LUMBAR RADICULOPATHY, CHRONIC: ICD-10-CM

## 2018-12-04 PROCEDURE — 97110 THERAPEUTIC EXERCISES: CPT

## 2018-12-04 PROCEDURE — 97014 ELECTRIC STIMULATION THERAPY: CPT

## 2018-12-04 PROCEDURE — 97140 MANUAL THERAPY 1/> REGIONS: CPT

## 2018-12-04 NOTE — OP THERAPY PROGRESS SUMMARY
Outpatient Physical Therapy  PROGRESS SUMMARY NOTE      [facility] Outpatient Physical Therapy  [address]  [place] [address]  Phone:  [phone]  Fax:  [phone]    Date of Visit: 12/04/2018    Patient: Aicha Choudhary  YOB: 1955  MRN: [id number]     Referring Provider: Casper Sheehan D.O.  [address] [place] [address]  [place] [address]   Referring Diagnosis No admission diagnoses are documented for this encounter.     Visit Diagnoses     ICD-10-CM   1. Contusion of foot including toes, right, subsequent encounter S90.31XD    S90.121D   2. Lumbar radiculopathy, chronic M54.16       Rehab Potential: good    Physical Therapy Occurrence Codes    Date of onset of impairment:  7/16/18   Date physical therapy care plan established or reviewed:  10/24/18   Date physical therapy treatment started:  10/24/18          Cert Period: 12/4/18 to 1/4/18  Progress Report Period: 10/24/18-12/4/18    Functional Limitation G-Codes and Severity Modifiers      Current status:     Goal status:           Objective Findings and Assessment:   Patient progression towards goals: Patient is a 62 year old female s/p work injury in July with mohan falling on patient foot. Patient presents with abnormal gait, impaired R ankle ROM, hypomobility of R foot multiple joints, and pain limiting functional mobility. She has reported confirmed break in the foot however no limitations or restrictions. She has some self limiting behavior but reports pain with extended weight bearing and walking. She has a follow up with physcian soon to determine course of treatment. She has been able to participate in some physical activity here in therapy. She is depressed about situation as money is tight and she has a desire and need to get back to work.     Short Term Goals:   1) Patient will report ability to walk >15 minutes without sxs GOAL NOT MET  2) Patient will report ability to negotiate stairs in home  "with 50% improvement of sxs. GOAL PARTIALLY MET     Short term goal time span:  2-4 weeks      Long Term Goals:    1) Patient will be independent with HEP GOAL MET  2) Patient will improve WOMAC > 50% GOAL NOT MET  3) Patient reports walking > 30 minutes without sxs GOAL NOT MET    Objective findings and assessment details: PROM R ankle   95 deg DF frm 90/neutral   35 deg of PF from 90/neutral     Goals:   Short Term Goals:   1. Walk 80 feet without antalgic limp  2. Stand with even weight distribution on both feet via therapist subjective observation  3. Able to step up 4\" step with involved foot without hha   4. Decrease WOMAC score by 5 points  5. Able to stand 10+ min during exercise with out need to sit    Short term goal time span:  2-4 weeks      Long Term Goals:    1. Ind with HEP  2. Walk 15+ min with increasing symtpoms   3. Decrease WOMAC score 10 points   Long term goal time span:  4-6 weeks    Plan:   Planned therapy interventions:  E Stim Unattended (CPT 25633), Gait Training (CPT 72419), Manual Therapy (CPT 22509), Neuromuscular Re-education (CPT 01830) and Therapeutic Exercise (CPT 83904)  Frequency:  2x week  Duration in weeks:  6  Duration in visits:  12        Referring provider co-signature:  I have reviewed this plan of care and my co-signature certifies the need for services.    Physician Signature: ________________________________ Date: ______________     "

## 2018-12-04 NOTE — OP THERAPY DAILY TREATMENT
Outpatient Physical Therapy  DAILY TREATMENT     [facility] Outpatient Physical Therapy  [address]  [place] [address]  Phone:  [phone]  Fax:  [phone]    Date: 12/04/2018    Patient: Aicha Choudhary  YOB: 1955  MRN: [id number]     Time Calculation             Chief Complaint: Difficulty Walking; Loss Of Balance; and Foot Problem    Visit #: 8    SUBJECTIVE:  Patient seen for follow up on after  for contusion of her foot. She had a follow up with Dr. Gimenez who reports she has some orthopedic issues/broke bone on the foot   OBJECTIVE:  Current objective measures:           Therapeutic Exercises (CPT 72827):     1. Nu step, Level 5 x 7 mins     2. Gastroc stretch/ rocker board, 4 direction     3. Re test range and goals update       Therapeutic Exercise Summary: Foot mojo given for HEP   1. Roll foot on ball  2. Splay toes  3. Big toe up/little toes down  4. Big toe wall ball stretch  5. Single leg stance.     Therapeutic Treatments and Modalities:     1. Manual Therapy (CPT 36520), R foot, Gentle IASTM and foot and ankle mobs, Catholic floss with ABC's     2. E Stim Unattended (CPT 20102), R foot, IFC and heat to R foot/ankle     Time-based treatments/modalities:          ASSESSMENT:   Response to treatment: Want to get her to do more active loading and weightbearing but her pain is a limiting factor. Will continue to progress as able. Will update progress and goals for new script. Report to follow.   PLAN/RECOMMENDATIONS:   Plan for treatment: therapy treatment to continue next visit.  Planned interventions for next visit: continue with current treatment.

## 2018-12-07 ENCOUNTER — APPOINTMENT (OUTPATIENT)
Dept: PHYSICAL THERAPY | Facility: MEDICAL CENTER | Age: 63
End: 2018-12-07
Attending: PREVENTIVE MEDICINE
Payer: COMMERCIAL

## 2018-12-11 ENCOUNTER — PHYSICAL THERAPY (OUTPATIENT)
Dept: PHYSICAL THERAPY | Facility: MEDICAL CENTER | Age: 63
End: 2018-12-11
Attending: PREVENTIVE MEDICINE
Payer: COMMERCIAL

## 2018-12-11 DIAGNOSIS — S90.121D CONTUSION OF FOOT INCLUDING TOES, RIGHT, SUBSEQUENT ENCOUNTER: ICD-10-CM

## 2018-12-11 DIAGNOSIS — S90.31XD CONTUSION OF FOOT INCLUDING TOES, RIGHT, SUBSEQUENT ENCOUNTER: ICD-10-CM

## 2018-12-11 DIAGNOSIS — M54.16 LUMBAR RADICULOPATHY, CHRONIC: ICD-10-CM

## 2018-12-11 PROCEDURE — 97014 ELECTRIC STIMULATION THERAPY: CPT

## 2018-12-11 PROCEDURE — 97110 THERAPEUTIC EXERCISES: CPT

## 2018-12-11 PROCEDURE — 97140 MANUAL THERAPY 1/> REGIONS: CPT

## 2018-12-11 NOTE — OP THERAPY DAILY TREATMENT
Outpatient Physical Therapy  DAILY TREATMENT     [facility] Outpatient Physical Therapy  [address]  [place] [address]  Phone:  [phone]  Fax:  [phone]    Date: 12/11/2018    Patient: Aicha Choudhary  YOB: 1955  MRN: [id number]     Time Calculation  Start time: 1330  Stop time: 1415 Time Calculation (min): 45 minutes     Chief Complaint: Difficulty Walking and Foot Problem    Visit #: 9    SUBJECTIVE:  Patient seen for follow up on after  for contusion of her foot. She had a follow up with Dr. Gimenez who reports she has some orthopedic issues/broke bone on the foot. She had to cancel her last visit as she was sick to her stomach.   OBJECTIVE:  Current objective measures:           Therapeutic Exercises (CPT 55588):     1. Nu step, Level 5 x 7 mins     2. Gastroc stretch/ rocker board, 4 direction     3. Walk on tandem foam roller x 6 feet, 2 different ways solid domed and unsteady wobbly side, 4 x each with hha x 1 at wall      Therapeutic Exercise Summary: Foot mojo given for HEP   1. Roll foot on ball  2. Splay toes  3. Big toe up/little toes down  4. Big toe wall ball stretch  5. Single leg stance.     Therapeutic Treatments and Modalities:     1. Manual Therapy (CPT 56734), R foot, Gentle IASTM and foot and ankle mobs, Sabianism floss with ABC's     2. E Stim Unattended (CPT 40279), R foot, IFC and heat to R foot/ankle     Time-based treatments/modalities:  Manual therapy minutes (CPT 82382): 15 minutes  Therapeutic exercise minutes (CPT 08313): 15 minutes       ASSESSMENT:   Response to treatment: Want to get her to do more active loading and weightbearing but her pain is a limiting factor. Will continue to progress as able.   Want to send request for x ray report via the [facility]/Dr Gimenez to ensure we are not missing any instructions  PLAN/RECOMMENDATIONS:   Plan for treatment: therapy treatment to continue next visit.  Planned interventions for next  visit: continue with current treatment.

## 2018-12-14 ENCOUNTER — PHYSICAL THERAPY (OUTPATIENT)
Dept: PHYSICAL THERAPY | Facility: MEDICAL CENTER | Age: 63
End: 2018-12-14
Attending: PREVENTIVE MEDICINE
Payer: COMMERCIAL

## 2018-12-14 DIAGNOSIS — S90.121D CONTUSION OF FOOT INCLUDING TOES, RIGHT, SUBSEQUENT ENCOUNTER: ICD-10-CM

## 2018-12-14 DIAGNOSIS — M54.16 LUMBAR RADICULOPATHY, CHRONIC: ICD-10-CM

## 2018-12-14 DIAGNOSIS — S90.31XD CONTUSION OF FOOT INCLUDING TOES, RIGHT, SUBSEQUENT ENCOUNTER: ICD-10-CM

## 2018-12-14 PROCEDURE — 97112 NEUROMUSCULAR REEDUCATION: CPT

## 2018-12-14 PROCEDURE — 97014 ELECTRIC STIMULATION THERAPY: CPT

## 2018-12-14 PROCEDURE — 97110 THERAPEUTIC EXERCISES: CPT

## 2018-12-14 NOTE — OP THERAPY DAILY TREATMENT
"  Outpatient Physical Therapy  DAILY TREATMENT     [facility] Outpatient Physical Therapy  [address]  [place] [address]  Phone:  [phone]  Fax:  [phone]    Date: 12/14/2018    Patient: Aicha Choudhary  YOB: 1955  MRN: [id number]     Time Calculation  Start time: 0930  Stop time: 1015 Time Calculation (min): 45 minutes     Chief Complaint: Foot Pain    Visit #: 10    SUBJECTIVE:  Pt continues to report very high pain in her foot and worse with increased standing/walking.     OBJECTIVE:  Current objective measures:   Laterality testing: feet 14/20  2-point discrimination: >5mm outside the norm for dorsal aspect of foot           Therapeutic Exercises (CPT 31415):     1. Nu step, Level 5 x 10  minutes    2. Ankle pumps AROM right foot, x20     Therapeutic Treatments and Modalities:     1. Neuromuscular Re-education (CPT 33453), Laterality testing, alarm system analogy, 2-point discrimination testing, x15 minutes    2. E Stim Unattended (CPT 07754), IFC and heat to R foot/ankle , x15 minutes    Time-based treatments/modalities:  Therapeutic exercise minutes (CPT 16920): 15 minutes  Neuromusc re-ed, balance, coor, post minutes (CPT 31087): 15 minutes       Pain rating before treatment: not given a specific number (\"greater than a 5\")  Pain rating after treatment: not given a specific number (\"extremely painful\")    ASSESSMENT:   Response to treatment: Pt continues to demos hypersensitivity throughout her right foot and continues to have very limited activity tolerance as a result. Pt will continue to benefit from laterality/proprioception training to reduce sensitivity and gradual progression of all exercises to promote full return to her PLOF.     PLAN/RECOMMENDATIONS:   Plan for treatment: therapy treatment to continue next visit.  Planned interventions for next visit: continue with current treatment.      "

## 2018-12-18 ENCOUNTER — PHYSICAL THERAPY (OUTPATIENT)
Dept: PHYSICAL THERAPY | Facility: MEDICAL CENTER | Age: 63
End: 2018-12-18
Attending: PREVENTIVE MEDICINE
Payer: COMMERCIAL

## 2018-12-18 DIAGNOSIS — S90.121D CONTUSION OF FOOT INCLUDING TOES, RIGHT, SUBSEQUENT ENCOUNTER: ICD-10-CM

## 2018-12-18 DIAGNOSIS — M54.16 LUMBAR RADICULOPATHY, CHRONIC: ICD-10-CM

## 2018-12-18 DIAGNOSIS — S90.31XD CONTUSION OF FOOT INCLUDING TOES, RIGHT, SUBSEQUENT ENCOUNTER: ICD-10-CM

## 2018-12-18 PROCEDURE — 97110 THERAPEUTIC EXERCISES: CPT

## 2018-12-18 PROCEDURE — 97140 MANUAL THERAPY 1/> REGIONS: CPT

## 2018-12-18 NOTE — OP THERAPY DAILY TREATMENT
"  Outpatient Physical Therapy  DAILY TREATMENT     [facility] Outpatient Physical Therapy  [address]  [place] [address]  Phone:  [phone]  Fax:  [phone]    Date: 12/18/2018    Patient: Aicha Choudhary  YOB: 1955  MRN: [id number]     Time Calculation  Start time: 1500  Stop time: 1545 Time Calculation (min): 45 minutes     Chief Complaint: Foot Pain    Visit #: 11    SUBJECTIVE:  Pt continues to have very high pain levels, very high anxiety levels and does not get very good sleep. Pt open to seeking assistance from a therapist to assist with her stress/anxiety levels.     OBJECTIVE:  Current objective measures:   Laterality testing: feet 23/25  2-point discrimination: >5mm outside the norm for dorsal aspect of foot           Therapeutic Exercises (CPT 56634):     1. Nu step, Level 5 x 10  minutes    2. Ankle pumps AROM right foot, x20     3. Laterality testing    4. Weight shifts onto right foot , standing on 1/2 foam roll x20    5. Calf stretch with heel rocker, on 1/2 foam roll x20      Therapeutic Exercise Summary: Pt given towel to simulate 1/2 foam roll at home    Therapeutic Treatments and Modalities:     1. Manual Therapy (CPT 54614), IASTM right foot, manual ROM across all planes, gentle joint mobs mid-fore foot, x15 minutes    2. E Stim Unattended (CPT 07186), IFC and heat to R foot/ankle , x15 minutes    Time-based treatments/modalities:  Manual therapy minutes (CPT 49512): 15 minutes  Therapeutic exercise minutes (CPT 84603): 15 minutes       Pain rating before treatment: \"very sore\"  Pain rating after treatment: \"pretty painful\"    ASSESSMENT:   Response to treatment: Pt demos improved laterality awareness, but struggles to address the other components necessary to help improve her pain threshold. Physically, however she is able to tolerate very gradual progression of increased weightbearing onto the right foot. Will continue to progress " functional activity/mobility as tolerated.    PLAN/RECOMMENDATIONS:   Plan for treatment: therapy treatment to continue next visit.  Planned interventions for next visit: continue with current treatment.

## 2018-12-20 ENCOUNTER — APPOINTMENT (OUTPATIENT)
Dept: PHYSICAL THERAPY | Facility: MEDICAL CENTER | Age: 63
End: 2018-12-20
Attending: PREVENTIVE MEDICINE
Payer: COMMERCIAL

## 2018-12-20 NOTE — OP THERAPY DAILY TREATMENT
Outpatient Physical Therapy  DAILY TREATMENT     [facility] Outpatient Physical Therapy  [address]  [place] [address]  Phone:  [phone]  Fax:  [phone]    Date: 12/20/2018    Patient: Aicha Choudhary  YOB: 1955  MRN: [id number]     Time Calculation             Chief Complaint: No chief complaint on file.    Visit #: 12    SUBJECTIVE:  Pt continues to have very high pain levels, very high anxiety levels and does not get very good sleep. Pt open to seeking assistance from a therapist to assist with her stress/anxiety levels.     OBJECTIVE:  Current objective measures:   Laterality testing: feet 23/25  2-point discrimination: >5mm outside the norm for dorsal aspect of foot           Therapeutic Exercises (CPT 20751):     1. Nu step, Level 5 x 10  minutes    2. Ankle pumps AROM right foot, x20     3. Laterality testing    4. Weight shifts onto right foot , standing on 1/2 foam roll x20    5. Calf stretch with heel rocker, on 1/2 foam roll x20      Therapeutic Exercise Summary: Pt given towel to simulate 1/2 foam roll at home    Therapeutic Treatments and Modalities:     1. Manual Therapy (CPT 50990), IASTM right foot, manual ROM across all planes, gentle joint mobs mid-fore foot, x15 minutes    2. E Stim Unattended (CPT 90607), IFC and heat to R foot/ankle , x15 minutes    Time-based treatments/modalities:          Pain rating before treatment: 0  Pain rating after treatment: 0    ASSESSMENT:   Response to treatment: Pt demos improved laterality awareness, but struggles to address the other components necessary to help improve her pain threshold. Physically, however she is able to tolerate very gradual progression of increased weightbearing onto the right foot. Will continue to progress functional activity/mobility as tolerated.    PLAN/RECOMMENDATIONS:   Plan for treatment: therapy treatment to continue next visit.  Planned interventions for next visit:  continue with current treatment.

## 2018-12-26 ENCOUNTER — PHYSICAL THERAPY (OUTPATIENT)
Dept: PHYSICAL THERAPY | Facility: MEDICAL CENTER | Age: 63
End: 2018-12-26
Attending: PREVENTIVE MEDICINE
Payer: COMMERCIAL

## 2018-12-26 DIAGNOSIS — S90.121D CONTUSION OF FOOT INCLUDING TOES, RIGHT, SUBSEQUENT ENCOUNTER: ICD-10-CM

## 2018-12-26 DIAGNOSIS — S90.31XD CONTUSION OF FOOT INCLUDING TOES, RIGHT, SUBSEQUENT ENCOUNTER: ICD-10-CM

## 2018-12-26 DIAGNOSIS — M54.16 LUMBAR RADICULOPATHY, CHRONIC: ICD-10-CM

## 2018-12-26 PROCEDURE — 97140 MANUAL THERAPY 1/> REGIONS: CPT

## 2018-12-26 PROCEDURE — 97014 ELECTRIC STIMULATION THERAPY: CPT

## 2018-12-26 PROCEDURE — 97110 THERAPEUTIC EXERCISES: CPT

## 2018-12-26 NOTE — OP THERAPY DAILY TREATMENT
Outpatient Physical Therapy  DAILY TREATMENT     [facility] Outpatient Physical Therapy  [address]  [place] [address]  Phone:  [phone]  Fax:  [phone]    Date: 12/26/2018    Patient: Aicha Choudhary  YOB: 1955  MRN: [id number]     Time Calculation  Start time: 1100  Stop time: 1145 Time Calculation (min): 45 minutes     Chief Complaint: Difficulty Walking and Foot Problem    Visit #: 12    SUBJECTIVE:  Patient seen for follow up on after  for contusion of her foot. She missed last visit on 12/20 but missed due to fall. She went to get up get ready for the day and reported that she couldn't bear weight and fell to the floor and scratched her forearm and fell on her body.       OBJECTIVE:  Current objective measures:           Therapeutic Exercises (CPT 50758):     1. Nu step, Level 5 x 7 mins     2. Gastroc stretch/ rocker board, 4 direction     3. 4 way banded ankle movement with pink band, Ankle DF, PF, IR, ER, 15 reps of each motion.      Therapeutic Exercise Summary: Home exercise program/foot work    Foot mojo given for HEP   1. Roll foot on ball  2. Splay toes  3. Big toe up/little toes down  4. Big toe wall ball stretch  5. Single leg stance.     Therapeutic Treatments and Modalities:     1. Manual Therapy (CPT 15843), R foot, Gentle IASTM and foot and ankle mobs, Catholic floss with ABC's     2. E Stim Unattended (CPT 46080), R foot, IFC and heat to R foot/ankle     Time-based treatments/modalities:  Manual therapy minutes (CPT 64088): 15 minutes  Therapeutic exercise minutes (CPT 65744): 15 minutes       ASSESSMENT:   Response to treatment: Want to get her to do more active loading and weightbearing but her pain is a limiting factor. Will continue to progress as able. Will try leg press next visit. She has a follow up with Dr. Gimenez/ office tomorrow   PLAN/RECOMMENDATIONS:   Plan for treatment: therapy treatment to continue next visit.  Planned  interventions for next visit: continue with current treatment.

## 2019-01-02 ENCOUNTER — APPOINTMENT (OUTPATIENT)
Dept: PHYSICAL THERAPY | Facility: MEDICAL CENTER | Age: 64
End: 2019-01-02
Attending: PREVENTIVE MEDICINE
Payer: COMMERCIAL

## 2019-01-04 ENCOUNTER — APPOINTMENT (OUTPATIENT)
Dept: PHYSICAL THERAPY | Facility: MEDICAL CENTER | Age: 64
End: 2019-01-04
Attending: PREVENTIVE MEDICINE
Payer: COMMERCIAL

## 2019-01-07 ENCOUNTER — PHYSICAL THERAPY (OUTPATIENT)
Dept: PHYSICAL THERAPY | Facility: MEDICAL CENTER | Age: 64
End: 2019-01-07
Attending: ORTHOPAEDIC SURGERY
Payer: COMMERCIAL

## 2019-01-07 DIAGNOSIS — S90.121D CONTUSION OF FOOT INCLUDING TOES, RIGHT, SUBSEQUENT ENCOUNTER: ICD-10-CM

## 2019-01-07 DIAGNOSIS — S90.31XD CONTUSION OF FOOT INCLUDING TOES, RIGHT, SUBSEQUENT ENCOUNTER: ICD-10-CM

## 2019-01-07 DIAGNOSIS — M54.16 LUMBAR RADICULOPATHY, CHRONIC: ICD-10-CM

## 2019-01-07 PROCEDURE — 97140 MANUAL THERAPY 1/> REGIONS: CPT

## 2019-01-07 PROCEDURE — 97110 THERAPEUTIC EXERCISES: CPT

## 2019-01-07 PROCEDURE — 97014 ELECTRIC STIMULATION THERAPY: CPT

## 2019-01-08 NOTE — OP THERAPY DAILY TREATMENT
Outpatient Physical Therapy  DAILY TREATMENT     [facility] Outpatient Physical Therapy  [address]  [place] [address]  Phone:  [phone]  Fax:  [phone]    Date: 01/07/2019    Patient: Aicha Choudhary  YOB: 1955  MRN: [id number]     Time Calculation  Start time: 1600  Stop time: 1645 Time Calculation (min): 45 minutes     Chief Complaint: Difficulty Walking and Foot Problem    Visit #: 13    SUBJECTIVE:  Patient seen for follow up on after  for contusion of her foot. She missed her last visit due to spider bites and needing to wash and take care of sheets and clothes. She missed her visit with orthopedic group cause she was in pain from a previous fall. Will keep rehab mostly the same for now until with have follow up with        OBJECTIVE:  Current objective measures:           Therapeutic Exercises (CPT 98859):     1. Nu step, Level 5 x 7 mins     2. Gastroc stretch/ rocker board, 4 direction     3. 4 way banded ankle movement with pink band, Ankle DF, PF, IR, ER, 15 reps of each motion.    Therapeutic Treatments and Modalities:     1. Manual Therapy (CPT 98414), R foot, Gentle IASTM and foot and ankle mobs, Confucianist floss with ABC's     2. E Stim Unattended (CPT 64269), R foot, IFC and heat to R foot/ankle     Time-based treatments/modalities:  Manual therapy minutes (CPT 09294): 15 minutes  Therapeutic exercise minutes (CPT 93735): 15 minutes       ASSESSMENT:   Response to treatment: Want to get her to do more active loading and weightbearing but her pain is a limiting factor. Waiting to see progress from doc. Patient reports that she has an appointment in a week or so for either progression on therapy or consult for potential surgery,      PLAN/RECOMMENDATIONS:   Plan for treatment: therapy treatment to continue next visit.  Planned interventions for next visit: continue with current treatment.

## 2019-01-14 ENCOUNTER — PHYSICAL THERAPY (OUTPATIENT)
Dept: PHYSICAL THERAPY | Facility: MEDICAL CENTER | Age: 64
End: 2019-01-14
Attending: ORTHOPAEDIC SURGERY
Payer: COMMERCIAL

## 2019-01-14 DIAGNOSIS — S90.121D CONTUSION OF FOOT INCLUDING TOES, RIGHT, SUBSEQUENT ENCOUNTER: ICD-10-CM

## 2019-01-14 DIAGNOSIS — S90.31XD CONTUSION OF FOOT INCLUDING TOES, RIGHT, SUBSEQUENT ENCOUNTER: ICD-10-CM

## 2019-01-14 PROCEDURE — 97014 ELECTRIC STIMULATION THERAPY: CPT

## 2019-01-14 PROCEDURE — 97140 MANUAL THERAPY 1/> REGIONS: CPT

## 2019-01-14 PROCEDURE — 97110 THERAPEUTIC EXERCISES: CPT

## 2019-01-14 NOTE — OP THERAPY DAILY TREATMENT
"  Outpatient Physical Therapy  DAILY TREATMENT     [facility] Outpatient Physical Therapy  [address]  [place] [address]  Phone:  [phone]  Fax:  [phone]    Date: 01/14/2019    Patient: Aicha Choudhary  YOB: 1955  MRN: [id number]     Time Calculation  Start time: 1130  Stop time: 1215 Time Calculation (min): 45 minutes     Chief Complaint: Work-Related Injury; Difficulty Walking; Ankle Problem; and Foot Problem    Visit #: 14    SUBJECTIVE:  Patient seen for follow up on after for contusion of her foot. She is doing ok. She reports she has had some busy days moving and bagging items in her home due to some spider/bug problems. Her son is working to clear this.     OBJECTIVE:  Current objective measures:           Therapeutic Exercises (CPT 68591):     1. Nu step, Level 5 x 7 mins     2. Gastroc stretch/ rocker board, 4 direction     3. Standing single leg hip extension/slow eccentric hip flexion, 10# 2 x 5-7 on each side     Therapeutic Treatments and Modalities:     1. Manual Therapy (CPT 17421), R foot, Gentle IASTM and foot and ankle mobs, Hoahaoism floss with ABC's     2. E Stim Unattended (CPT 48568), R foot, IFC and heat to R foot/ankle     Time-based treatments/modalities:  Manual therapy minutes (CPT 99741): 15 minutes  Therapeutic exercise minutes (CPT 62029): 15 minutes       ASSESSMENT:   Response to treatment: Her ankle is very \"clunky\" hypermobile. Will hold serve with treatment doing lighting active ankle and hip stability work. She will have a follow up with her physician in a week or so to check status of foot/healing.   PLAN/RECOMMENDATIONS:   Plan for treatment: therapy treatment to continue next visit.  Planned interventions for next visit: continue with current treatment.      "

## 2019-01-18 ENCOUNTER — HOSPITAL ENCOUNTER (EMERGENCY)
Facility: MEDICAL CENTER | Age: 64
End: 2019-01-18
Attending: EMERGENCY MEDICINE
Payer: COMMERCIAL

## 2019-01-18 ENCOUNTER — APPOINTMENT (OUTPATIENT)
Dept: RADIOLOGY | Facility: MEDICAL CENTER | Age: 64
End: 2019-01-18
Attending: EMERGENCY MEDICINE
Payer: COMMERCIAL

## 2019-01-18 VITALS
OXYGEN SATURATION: 94 % | DIASTOLIC BLOOD PRESSURE: 95 MMHG | HEIGHT: 67 IN | WEIGHT: 240.3 LBS | HEART RATE: 95 BPM | RESPIRATION RATE: 18 BRPM | TEMPERATURE: 97.6 F | SYSTOLIC BLOOD PRESSURE: 153 MMHG | BODY MASS INDEX: 37.72 KG/M2

## 2019-01-18 DIAGNOSIS — R21 SKIN RASH: ICD-10-CM

## 2019-01-18 DIAGNOSIS — L29.9 ITCHING: ICD-10-CM

## 2019-01-18 LAB
ALBUMIN SERPL BCP-MCNC: 4.8 G/DL (ref 3.2–4.9)
ALBUMIN/GLOB SERPL: 1.3 G/DL
ALP SERPL-CCNC: 121 U/L (ref 30–99)
ALT SERPL-CCNC: 26 U/L (ref 2–50)
AMPHETAMINES UR QL SCN: NEGATIVE
ANION GAP SERPL CALC-SCNC: 16 MMOL/L (ref 0–11.9)
APPEARANCE UR: CLEAR
AST SERPL-CCNC: 30 U/L (ref 12–45)
BACTERIA #/AREA URNS HPF: ABNORMAL /HPF
BARBITURATES UR QL SCN: NEGATIVE
BASOPHILS # BLD AUTO: 0.6 % (ref 0–1.8)
BASOPHILS # BLD: 0.06 K/UL (ref 0–0.12)
BENZODIAZ UR QL SCN: NEGATIVE
BILIRUB SERPL-MCNC: 1.4 MG/DL (ref 0.1–1.5)
BILIRUB UR QL STRIP.AUTO: ABNORMAL
BUN SERPL-MCNC: 14 MG/DL (ref 8–22)
CALCIUM SERPL-MCNC: 9.1 MG/DL (ref 8.4–10.2)
CASTS URNS QL MICRO: ABNORMAL /LPF
CHLORIDE SERPL-SCNC: 103 MMOL/L (ref 96–112)
CO2 SERPL-SCNC: 18 MMOL/L (ref 20–33)
COCAINE UR QL SCN: NEGATIVE
COLOR UR: YELLOW
CREAT SERPL-MCNC: 0.96 MG/DL (ref 0.5–1.4)
EOSINOPHIL # BLD AUTO: 0.11 K/UL (ref 0–0.51)
EOSINOPHIL NFR BLD: 1.1 % (ref 0–6.9)
EPI CELLS #/AREA URNS HPF: ABNORMAL /HPF
ERYTHROCYTE [DISTWIDTH] IN BLOOD BY AUTOMATED COUNT: 56.9 FL (ref 35.9–50)
ETHANOL BLD-MCNC: 0 G/DL
GLOBULIN SER CALC-MCNC: 3.8 G/DL (ref 1.9–3.5)
GLUCOSE SERPL-MCNC: 129 MG/DL (ref 65–99)
GLUCOSE UR STRIP.AUTO-MCNC: NEGATIVE MG/DL
HCT VFR BLD AUTO: 55.3 % (ref 37–47)
HGB BLD-MCNC: 18.4 G/DL (ref 12–16)
IMM GRANULOCYTES # BLD AUTO: 0.01 K/UL (ref 0–0.11)
IMM GRANULOCYTES NFR BLD AUTO: 0.1 % (ref 0–0.9)
KETONES UR STRIP.AUTO-MCNC: ABNORMAL MG/DL
LEUKOCYTE ESTERASE UR QL STRIP.AUTO: NEGATIVE
LYMPHOCYTES # BLD AUTO: 0.91 K/UL (ref 1–4.8)
LYMPHOCYTES NFR BLD: 9.4 % (ref 22–41)
MCH RBC QN AUTO: 32.4 PG (ref 27–33)
MCHC RBC AUTO-ENTMCNC: 33.3 G/DL (ref 33.6–35)
MCV RBC AUTO: 97.4 FL (ref 81.4–97.8)
MICRO URNS: ABNORMAL
MONOCYTES # BLD AUTO: 0.6 K/UL (ref 0–0.85)
MONOCYTES NFR BLD AUTO: 6.2 % (ref 0–13.4)
MUCOUS THREADS #/AREA URNS HPF: ABNORMAL /HPF
NEUTROPHILS # BLD AUTO: 8.01 K/UL (ref 2–7.15)
NEUTROPHILS NFR BLD: 82.6 % (ref 44–72)
NITRITE UR QL STRIP.AUTO: NEGATIVE
NRBC # BLD AUTO: 0 K/UL
NRBC BLD-RTO: 0 /100 WBC
OPIATES UR QL SCN: NEGATIVE
PCP UR QL SCN: NEGATIVE
PH UR STRIP.AUTO: 5.5 [PH]
PLATELET # BLD AUTO: 218 K/UL (ref 164–446)
PMV BLD AUTO: 11.1 FL (ref 9–12.9)
POTASSIUM SERPL-SCNC: 4.2 MMOL/L (ref 3.6–5.5)
PROT SERPL-MCNC: 8.6 G/DL (ref 6–8.2)
PROT UR QL STRIP: 30 MG/DL
RBC # BLD AUTO: 5.68 M/UL (ref 4.2–5.4)
RBC # URNS HPF: ABNORMAL /HPF
RBC UR QL AUTO: NEGATIVE
SODIUM SERPL-SCNC: 137 MMOL/L (ref 135–145)
SP GR UR REFRACTOMETRY: 1.03
THC UR QL SCN: POSITIVE
WBC # BLD AUTO: 9.7 K/UL (ref 4.8–10.8)
WBC #/AREA URNS HPF: ABNORMAL /HPF

## 2019-01-18 PROCEDURE — 99284 EMERGENCY DEPT VISIT MOD MDM: CPT

## 2019-01-18 PROCEDURE — 700102 HCHG RX REV CODE 250 W/ 637 OVERRIDE(OP): Performed by: EMERGENCY MEDICINE

## 2019-01-18 PROCEDURE — 80053 COMPREHEN METABOLIC PANEL: CPT

## 2019-01-18 PROCEDURE — 85025 COMPLETE CBC W/AUTO DIFF WBC: CPT

## 2019-01-18 PROCEDURE — A9270 NON-COVERED ITEM OR SERVICE: HCPCS | Performed by: EMERGENCY MEDICINE

## 2019-01-18 PROCEDURE — 81001 URINALYSIS AUTO W/SCOPE: CPT

## 2019-01-18 PROCEDURE — 80305 DRUG TEST PRSMV DIR OPT OBS: CPT

## 2019-01-18 PROCEDURE — 80307 DRUG TEST PRSMV CHEM ANLYZR: CPT

## 2019-01-18 PROCEDURE — 70450 CT HEAD/BRAIN W/O DYE: CPT

## 2019-01-18 RX ORDER — HYDROXYZINE HYDROCHLORIDE 25 MG/1
25 TABLET, FILM COATED ORAL 3 TIMES DAILY PRN
Qty: 20 TAB | Refills: 0 | Status: SHIPPED | OUTPATIENT
Start: 2019-01-18 | End: 2020-02-10

## 2019-01-18 RX ORDER — LORAZEPAM 2 MG/1
2 TABLET ORAL 3 TIMES DAILY PRN
COMMUNITY

## 2019-01-18 RX ORDER — LORAZEPAM 1 MG/1
1 TABLET ORAL ONCE
Status: COMPLETED | OUTPATIENT
Start: 2019-01-18 | End: 2019-01-18

## 2019-01-18 RX ORDER — DIPHENHYDRAMINE HCL 25 MG
50 TABLET ORAL EVERY 6 HOURS PRN
COMMUNITY

## 2019-01-18 RX ORDER — ZOLPIDEM TARTRATE 10 MG/1
10 TABLET ORAL NIGHTLY PRN
COMMUNITY

## 2019-01-18 RX ORDER — TRAMADOL HYDROCHLORIDE 50 MG/1
50 TABLET ORAL 3 TIMES DAILY PRN
COMMUNITY

## 2019-01-18 RX ORDER — OXYCODONE HYDROCHLORIDE 10 MG/1
10 TABLET ORAL 3 TIMES DAILY PRN
COMMUNITY

## 2019-01-18 RX ADMIN — LORAZEPAM 1 MG: 1 TABLET ORAL at 14:17

## 2019-01-18 ASSESSMENT — PAIN SCALES - GENERAL: PAINLEVEL_OUTOF10: 2

## 2019-01-18 NOTE — ED NOTES
"1417:  Pt states has been spraying \"Flea and Tick killer\" in shoes and scrubbing body down w/ \"super hot water\" several times a day.  Pt states has been spraying linens and house w/ insecticide.  Pt provided w/ washcloths and water to clean feet and wipe down arms and legs.  1456:  Pt taken to Radiology.  "

## 2019-01-18 NOTE — ED NOTES
Med Rec completed per patient  Allergies reviewed  No ORAL antibiotics in last 30 days    Narcs have been verified

## 2019-01-18 NOTE — ED PROVIDER NOTES
ED Provider Note    Chief Complaint:   Insect bite    HPI:  Aicha Choudhary is a 63 y.o. female who presents out of concern for insect bites.  2 weeks ago, she noticed some bites on her legs.  Since that time, she has become very paranoid that there are bugs in the room.  She notices white things floating around the room and when they land they turn into insects.  She states she has thrown out all of her furniture, has changed all the blinds.  She has had new furniture delivered, and yet she is still concerned that she is seeing insects crawling on the bed in the walls.  In the exam room, she is seeing insects on the gurney, as well as on the walls.  I do not see any such bugs.    She denies any prior history of hallucinations, states she does have a history of anxiety and recently ran home Ativan due to increased anxiety.  She does have intermittent binge drinking episodes, states she drank a bottle of wine yesterday.  She denies any recreational drug use.  Denies any fevers, however has had nausea and vomiting over the past 2 weeks.  Denies dysuria, denies hematuria.    Review of Systems:  See HPI for pertinent positives and negatives. All other systems negative.    Past Medical History:   has a past medical history of Depression and hysterectomy.    Social History:  Social History     Social History Main Topics   • Smoking status: Former Smoker     Quit date: 1/7/2008   • Smokeless tobacco: Never Used   • Alcohol use Yes      Comment: Occasionally   • Drug use: No   • Sexual activity: Not on file       Surgical History:   has a past surgical history that includes other (left knee surgery) and hernia repair.    Current Medications:  Home Medications     Reviewed by Jose Guadalupe Lloyd (Pharmacy Tech) on 01/18/19 at 1431  Med List Status: Complete   Medication Last Dose Status   diphenhydrAMINE (BENADRYL) 25 MG Tab 1/18/2019 Active   LORazepam (ATIVAN) 2 MG tablet 1/17/2019 Active   oxyCODONE immediate release  "(ROXICODONE) 10 MG immediate release tablet FEW DAYS AGO Active   tramadol (ULTRAM) 50 MG Tab 1/17/2019 Active   zolpidem (AMBIEN) 10 MG Tab PRN Active                Allergies:  Allergies   Allergen Reactions   • Keflex    • Penicillins        Physical Exam:  Vital Signs: /87   Pulse (!) 102   Temp 36.6 °C (97.8 °F) (Temporal)   Resp 18   Ht 1.702 m (5' 7\")   Wt 109 kg (240 lb 4.8 oz)   SpO2 97%   BMI 37.64 kg/m²   Constitutional: Alert, no acute distress  HENT: Moist mucus membranes, normal posterior pharynx, no intraoral lesions  Eyes: Pupils equal and reactive, normal conjunctiva  Neck: Supple, normal range of motion, no stridor  Cardiovascular: Extremities are warm and well perfused, no murmur appreciated, normal cardiac auscultation  Pulmonary: No respiratory distress, normal work of breathing, no accessory muscule usage, breath sounds clear and equal bilaterally  Abdomen: Soft, non-distended, non-tender to palpation, no peritoneal signs  Skin: Warm, dry, few scattered excoriated lesions, no vesicular lesions, no petechiae, no purpura, no bullous lesions  Musculoskeletal: Normal range of motion in all extremities, no swelling or deformity noted  Neurologic: Alert, oriented, normal speech, normal motor function  Psychiatric: Very anxious affect, patient is open all of the alcohol wipes in the drawer and is using them to \" the bugs\", this appears to be a visual hallucination as I see no evidence of insects on the patient, no evidence of insects in the room    Medical records reviewed for continuity of care.  No recent visits for similar symptoms.    Labs:  Labs Reviewed   CBC WITH DIFFERENTIAL - Abnormal; Notable for the following:        Result Value    RBC 5.68 (*)     Hemoglobin 18.4 (*)     Hematocrit 55.3 (*)     MCHC 33.3 (*)     RDW 56.9 (*)     Neutrophils-Polys 82.60 (*)     Lymphocytes 9.40 (*)     Neutrophils (Absolute) 8.01 (*)     Lymphs (Absolute) 0.91 (*)     All other " components within normal limits   COMP METABOLIC PANEL - Abnormal; Notable for the following:     Co2 18 (*)     Anion Gap 16.0 (*)     Glucose 129 (*)     Alkaline Phosphatase 121 (*)     Total Protein 8.6 (*)     Globulin 3.8 (*)     All other components within normal limits   URINALYSIS,CULTURE IF INDICATED - Abnormal; Notable for the following:     Ketones Trace (*)     Protein 30 (*)     Bilirubin Moderate (*)     All other components within normal limits   ESTIMATED GFR - Abnormal; Notable for the following:     GFR If Non  59 (*)     All other components within normal limits   UR DRUG SCREEN(SO [place] ONLY) - Abnormal; Notable for the following:     Urine THC Positive (*)     All other components within normal limits   URINE MICROSCOPIC (W/UA) - Abnormal; Notable for the following:     Bacteria Many (*)     Epithelial Cells Many (*)     Urine Casts 3-5 Hyaline (*)     All other components within normal limits   DIAGNOSTIC ALCOHOL   REFRACTOMETER SG     Radiology:  CT-HEAD W/O   Final Result      Head CT without contrast within normal limits. No evidence of acute cerebral infarction, hemorrhage or mass lesion.         ED Medications Administered:  Medications   LORazepam (ATIVAN) tablet 1 mg (1 mg Oral Given 1/18/19 1417)       Differential diagnosis:  Alcohol withdrawal, electrolyte abnormality, hallucinations, anxiety, delusional disorder    MDM:  History and physical exam as documented above.  Given her abnormal hallucinations, I did consider alcohol withdrawal.  At this time she has no tremor, no tachycardia, no severe agitation.  States she does not drink daily and does not have a history of alcohol withdrawal symptoms.     On laboratory evaluation glucose is mildly elevated, she has no significant electrolyte abnormalities.  Diagnostic alcohol level is 0.  She is afebrile, has a normal white blood count, no evidence of infectious etiology.  Hemoglobin is elevated to 18.4, possibly  consistent with dehydration. Urinalysis demonstrates many bacteria as well as many epithelial cells most likely contamination as the patient does not have any urinary symptoms, 0 white blood cells, urine drug screen is positive only for marijuana.    CT head is negative for acute process.    On reassessment, after receiving her home dose of Ativan in the emergency department, she is somewhat improved.  She does not appear to have any grave disability, no indication for legal hold at this time.  She does have a psychiatrist whom she follows up with, she is counseled to call him tomorrow to schedule a follow-up appointment.  Return precautions discussed including recurrent or worsening rashes, fevers, extremity swelling, or any further concerns.    Blood pressure today is greater than 120/80, patient is instructed to follow up with primary care provider for blood pressure recheck.    Disposition:  Discharged home in stable condition    Final Impression:  1. Itching    2. Skin rash        Electronically signed by: Sue Campbell, 1/18/2019 4:29 PM

## 2019-01-18 NOTE — ED TRIAGE NOTES
"C/O multiple insect bites \"all over my upper back and legs for the past 2 weeks.\"  She has a hx of severe anxiety , and has cried throughout the triage process.     Chief Complaint   Patient presents with   • Insect Bite     BP (!) 164/97   Pulse 76   Temp 36.6 °C (97.8 °F) (Temporal)   Resp 18   Ht 1.702 m (5' 7\")   Wt 109 kg (240 lb 4.8 oz)   SpO2 99%   BMI 37.64 kg/m²       "

## 2019-01-19 NOTE — ED NOTES
Reviewed discharge instructions and printed prescription for Atarax w/ pt, stressed importance of following up w/ PCP for referral, encouraged pt not to use any more chemicals or pesticides on skin.  Pt ambulated to BR to change then ambulated from ED.

## 2019-01-19 NOTE — DISCHARGE PLANNING
Pt called stating she still feels like there are bugs crawling on her.  She states she pulled a bug out of her ear today. Pt doesn't know if she's 'tripping' or what's going on but she hasn't been able to sleep.  She has been taking her Atarax. She denies taking drugs or drinking alcohol (except one glass of wine). Pt states she has a Rx for Ativan (Dr Hernandez, psychiatry fills Rx)  but is currently out because she increased her dose due to the panic attacks of the bugs. Instructed to return to ER if needed.

## 2019-07-08 NOTE — PROGRESS NOTES
"Subjective:      Aicha Choudhary is a 62 y.o. female who presents with Follow-Up ( DOI: 7/16/2018 (R) foot -same- Rm#21)      DOI 07/16/18: 63 yo female presents with right foot injury. Another employee dropped a steel bar on her foot twice.  X-rays of the right foot were negative for any acute findings.  Patient states overall symptoms are the same.  Patient states that overall symptoms continue to be most prominent in the midfoot.  She continues no pain with prolonged standing and walking.  Denies any numbness or tingling.  She had her MRI performed last week.     HPI    Review of Systems   Neurological: Negative for tingling and sensory change.     SOCHX: Works as a  at [email or web address]   FH: No pertinent family history to this problem.       Objective:     /86   Pulse 84   Temp 36.8 °C (98.3 °F)   Ht 1.702 m (5' 7\")   Wt 110 kg (242 lb 6.4 oz)   SpO2 93%   BMI 37.97 kg/m²      Physical Exam   Constitutional: She is oriented to person, place, and time. She appears well-developed and well-nourished.   Cardiovascular: Normal rate.    Pulmonary/Chest: Effort normal.   Neurological: She is alert and oriented to person, place, and time.   Skin: Skin is warm and dry.   Psychiatric: She has a normal mood and affect. Judgment normal.       Right foot: Surgical scar consistent with prior surgery.  Tenderness to palpation from the first MTP to the fifth MTP, more so around th midfoot . full range of motion of digits and ankle.  Slight antalgic gait.    MRI Right Foot: Marrow edema anterior process of calcaneus. Moderate diffuse muscle atrophy.  Severe first metatarsophalangeal osteoarthritis.  No fracture or stress fracture.       Assessment/Plan:     1. Contusion of right foot, subsequent encounter  - REFERRAL TO PHYSICAL THERAPY Reason for Therapy: Eval/Treat/Report    Referral to physical therapy  Continue restricted duty  Follow-up 3 weeks  " No

## 2019-12-16 ENCOUNTER — OCCUPATIONAL MEDICINE (OUTPATIENT)
Dept: URGENT CARE | Facility: CLINIC | Age: 64
End: 2019-12-16
Payer: COMMERCIAL

## 2019-12-16 VITALS
BODY MASS INDEX: 37.67 KG/M2 | HEART RATE: 66 BPM | HEIGHT: 67 IN | RESPIRATION RATE: 20 BRPM | TEMPERATURE: 97.9 F | SYSTOLIC BLOOD PRESSURE: 108 MMHG | WEIGHT: 240 LBS | DIASTOLIC BLOOD PRESSURE: 62 MMHG | OXYGEN SATURATION: 95 %

## 2019-12-16 DIAGNOSIS — S89.92XA INJURY OF LEFT KNEE, INITIAL ENCOUNTER: ICD-10-CM

## 2019-12-16 DIAGNOSIS — S86.912A STRAIN OF LEFT KNEE, INITIAL ENCOUNTER: ICD-10-CM

## 2019-12-16 PROCEDURE — 99213 OFFICE O/P EST LOW 20 MIN: CPT | Mod: 29 | Performed by: NURSE PRACTITIONER

## 2019-12-16 ASSESSMENT — PAIN SCALES - GENERAL: PAINLEVEL: 6=MODERATE PAIN

## 2019-12-16 NOTE — PROGRESS NOTES
Chief Complaint   Patient presents with   • Knee Injury     Left knee injury, painful, radiates pain from her right side to her left side of her knee x today       HISTORY OF PRESENT ILLNESS: Patient is a 63 y.o. female who presents to urgent care today with a work comp complaint of knee pain. DOI 12/10/19: Patient had just arrived to work at 530am. As she was walking into work, the parking lot was covered with ice, no ice had been placed. She accidentally slipped, twisting her left knee, did not result in a fall. She immediately developed pain to her medial and anterior aspect of knee. She has had pain since. Admits to associated weakness. She has tried ice. She already takes gabapentin and oxycontin for chronic right foot pain. Notes history of left sided tibial plateau fracture requiring surgery in 2012, otherwise denies knee history.         PMH: No pertinent past medical history to this problem  MEDS: Medications were reviewed in Epic  ALLERGIES: Allergies were reviewed in Epic  FH: No pertinent family history to this problem      ROS:  Review of Systems   Constitutional: Negative for fever, chills, weight loss, malaise, and fatigue.   HENT: Negative for ear pain, nosebleeds, congestion, sore throat and neck pain.    Eyes: Negative for vision changes.   Neuro: Negative for headache, sensory changes, weakness, seizure, LOC.   Cardiovascular: Negative for chest pain, palpitations, orthopnea and leg swelling.   Respiratory: Negative for cough, sputum production, shortness of breath and wheezing.   Gastrointestinal: Negative for abdominal pain, nausea, vomiting or diarrhea.   Genitourinary: Negative for dysuria, urgency and frequency.  Musculoskeletal: Positive for left knee pain. for falls, neck pain, back pain, myalgias.   Skin: Negative for rash, diaphoresis.     Exam:  /62 (BP Location: Left arm, Patient Position: Sitting, BP Cuff Size: Adult long)   Pulse 66   Temp 36.6 °C (97.9 °F) (Temporal)    "Resp 20   Ht 1.702 m (5' 7\")   Wt 108.9 kg (240 lb)   SpO2 95%   General: well-nourished, well-developed female in NAD  Head: normocephalic, atraumatic  Eyes: PERRLA, no conjunctival injection, acuity grossly intact, lids normal.  Ears: normal shape and symmetry, no tenderness, no discharge. External canals are without any significant edema or erythema. Tympanic membranes are without any inflammation, no effusion. Gross auditory acuity is intact.  Nose: symmetrical without tenderness, no discharge.  Mouth/Throat: reasonable hygiene, no erythema, exudates or tonsillar enlargement.  Neck: no masses, range of motion within normal limits, no tracheal deviation. No obvious thyroid enlargement.   Lymph: no cervical adenopathy. No supraclavicular adenopathy.   Neuro: alert and oriented. Cranial nerves 1-12 grossly intact. No sensory deficit.   Cardiovascular: regular rate and rhythm. No edema.  Pulmonary: no distress. Chest is symmetrical with respiration, no wheezes, crackles, or rhonchi.   Musculoskeletal: no clubbing, appropriate muscle tone. Gait antalgic. Left knee: mildly swollen, tender to medial aspect and tibial tendon. Knee has FROM, no obvious laxity. N/V intact. Skin intact.   Skin: warm, dry, intact, no clubbing, no cyanosis, no rashes.   Psych: appropriate mood, affect, judgement.         Assessment/Plan:  1. Strain of left knee, initial encounter  REFERRAL TO ORTHOPEDICS   2. Injury of left knee, initial encounter             Suspect strain but also concerned about potential for ligament/meniscus injury, referral to ortho placed. OTC NSAIDS, RICE, knee brace, work restrictions, RTC 12/21/19  Supportive care, differential diagnoses, and indications for immediate follow-up discussed with patient.   Pathogenesis of diagnosis discussed including typical length and natural progression.   Instructed to return to clinic or nearest emergency department sooner for any change in condition, further concerns, or " worsening of symptoms.  Patient states understanding of the plan of care and discharge instructions.          Please note that this dictation was created using voice recognition software. I have made every reasonable attempt to correct obvious errors, but I expect that there are errors of grammar and possibly content that I did not discover before finalizing the note.      JASON Saenz.

## 2019-12-16 NOTE — LETTER
[facility] [address] - [place] [address]  Phone:  [phone] - Fax:  [phone]   [facility] Progress Report and Disability Certification  Date of Service: 12/16/2019   No Show:  No  Date / Time of Next Visit: 12/21/2019   Claim Information   Patient Name: Aicha Choudhary  Claim Number:     Employer: [employer]  Date of Injury: 12/10/2019     Insurer / TPA: [employer]  ID / SSN:     Occupation: Production  Diagnosis: The encounter diagnosis was Strain of left knee, initial encounter.    Medical Information   Related to Industrial Injury? Yes    Subjective Complaints:  DOI 12/10/19: Patient had just arrived to work at 530am. As she was walking into work, the parking lot was covered with ice, no ice had been placed. She accidentally slipped, twisting her left knee, did not result in a fall. She immediately developed pain to her medial and anterior aspect of knee. She has had pain since. Admits to associated weakness. She has tried ice. She already takes gabapentin and oxycontin for chronic right foot pain. Notes history of left sided tibial plateau fracture requiring surgery in 2012, otherwise denies knee history.    Objective Findings: A/Ox4. NAD. Gait antalgic. Left knee: mildly swollen, tender to medial aspect and tibial tendon. Knee has FROM, no obvious laxity. N/V intact. Skin intact.    Pre-Existing Condition(s): Previous tibial plateau fracture   Assessment:   Initial Visit    Status: Additional Care Required  Permanent Disability:No    Plan: Medication  Comments:OTC NSAIDS, RICE, Knee brace, ref ortho, work restrictions, RTC 12/21/19    Diagnostics:   Comments:N/A    Comments:       Disability Information   Status: Released to Restricted Duty    From:  12/16/2019  Through: 12/21/2019 Restrictions are: Temporary   Physical Restrictions   Sitting:    Standing:  < or = to 1 hr/day Stooping:    Bending:      Squatting:   Walking:  < or = to 2 hrs/day Climbin hrs/day Pushing:      Pulling:    Other:    Reaching Above Shoulder (L):   Reaching Above Shoulder (R):       Reaching Below Shoulder (L):    Reaching Below Shoulder (R):      Not to exceed Weight Limits   Carrying(hrs):   Weight Limit(lb): < or = to 10 pounds Lifting(hrs):   Weight  Limit(lb): < or = to 10 pounds   Comments:      Repetitive Actions   Hands: i.e. Fine Manipulations from Grasping:     Feet: i.e. Operating Foot Controls:     Driving / Operate Machinery:     Physician Name: GIANNI Saenz Physician Signature: MARIIA Schwartz e-Signature: Dr. Connor Aldana, Medical Director   Clinic Name / Location: [address] [place] [address] [place] [address] Clinic Phone Number: Dept: [phone]   Appointment Time: 1:30 Pm Visit Start Time: 1:39 PM   Check-In Time:  1:29 Pm Visit Discharge Time:  1:57PM   Original-Treating Physician or Chiropractor    Page 2-Insurer/TPA    Page 3-Employer    Page 4-Employee

## 2019-12-16 NOTE — LETTER
EMPLOYEE’S CLAIM FOR COMPENSATION/ REPORT OF INITIAL TREATMENT  FORM C-4    EMPLOYEE’S CLAIM - PROVIDE ALL INFORMATION REQUESTED   First Name  Aicha Last Name  Kenrick Birthdate                    1955                Sex  female Claim Number   Home Address  [address] Age  63 y.o. Height  67IN Weight  210 SSN     [facility] Zip  [address] Telephone  [phone] (home)    Mailing Address  [address] [facility] Zip  [address] Primary Language Spoken  English    Insurer  [employer] Third Party   [employer]   Employee's Occupation (Job Title) When Injury or Occupational Disease Occurred  Production    Employer's Name  [employer]  Telephone  [phone]    Employer Address  [address]  [facility]  Zip  [address]    Date of Injury  12/10/2019               Hour of Injury  5:30 AM Date Employer Notified  12/10/2019 Last Day of Work after Injury or Occupational Disease  12/15/2019 Supervisor to Whom Injury Reported  Pelon Wiley   Address or Location of Accident (if applicable)  [[email or web address] parking lot]   What were you doing at the time of accident? (if applicable)  Walking from parking lot    How did this injury or occupational disease occur? (Be specific an answer in detail. Use additional sheet if necessary)  walking into work, parking lot very icy, despite walking carefully, I nearly fell and hurt my knee   If you believe that you have an occupational disease, when did you first have knowledge of the disability and it relationship to your employment?  12/10/19 Witnesses to the Accident  N/A      Nature of Injury or Occupational Disease  Strain  Part(s) of Body Injured or Affected  Knee (L), Knee (R), N/A    I certify that the above is true and correct to the best of my knowledge and that I have provided this information in order to obtain the benefits of [facility]  Industrial Insurance and Occupational Diseases Acts [place] inclusive or Chapter 617 of [place]).  I hereby authorize any physician, chiropractor, surgeon, practitioner, or other person, any hospital, including [facility] or [facility], any medical service organization, any insurance company, or other institution or organization to release to each other, any medical or other information, including benefits paid or payable, pertinent to this injury or disease, except information relative to diagnosis, treatment and/or counseling for AIDS, psychological conditions, alcohol or controlled substances, for which I must give specific authorization.  A Photostat of this authorization shall be as valid as the original.     Date:12-   Place: [facility]   Employee’s Signature   THIS REPORT MUST BE COMPLETED AND MAILED WITHIN 3 WORKING DAYS OF TREATMENT   Place  [facility]  Name of Facility  [facility]   Date  12/16/2019 Diagnosis  (S86.912A) Strain of left knee, initial encounter Is there evidence the injured employee was under the influence of alcohol and/or another controlled substance at the time of accident?   Hour  1:39 PM Description of Injury or Disease  The encounter diagnosis was Strain of left knee, initial encounter. No   Treatment  OTC NSAIDS, RICE, Knee brace, ref ortho, work restrictions, RTC 12/21/19  Have you advised the patient to remain off work five days or more? No   X-Ray Findings    Comments:N/A   If Yes   From Date  To Date      From information given by the employee, together with medical evidence, can you directly connect this injury or occupational disease as job incurred?  Yes If No Full Duty No Modified Duty  Yes   Is additional medical care by a physician indicated?  Yes If Modified Duty, Specify any Limitations / Restrictions  D-39   Do you know of any previous injury or disease contributing to this condition or occupational disease?         "                    Yes  Comments:Previous Tibial plateau fracture   Date  12/16/2019 Print Doctor’s Name GIANNI Saenz I certify the employer’s copy of  this form was mailed on:   Address  [address] Insurer’s Use Only     [facility] Zip  [address]    Provider’s Tax ID Number  [id number] Telephone  Dept: [phone]        meg-MARIIA Turner   e-Signature: Dr. Connor Aldana,   Medical Director Degree  MD        ORIGINAL-TREATING PHYSICIAN OR CHIROPRACTOR    PAGE 2-INSURER/TPA    PAGE 3-EMPLOYER    PAGE 4-EMPLOYEE             Form C-4 (rev.10/07)              BRIEF DESCRIPTION OF RIGHTS AND BENEFITS  (Pursuant to NRS 616C.050)    Notice of Injury or Occupational Disease (Incident Report Form C-1): If an injury or occupational disease (OD) arises out of and in the course of employment, you must provide written notice to your employer as soon as practicable, but no later than 7 days after the accident or OD. Your employer shall maintain a sufficient supply of the required forms.    Claim for Compensation (Form C-4): If medical treatment is sought, the form C-4 is available at the place of initial treatment. A completed \"Claim for Compensation\" (Form C-4) must be filed within 90 days after an accident or OD. The treating physician or chiropractor must, within 3 working days after treatment, complete and mail to the employer, the employer's insurer and third-party , the Claim for Compensation.    Medical Treatment: If you require medical treatment for your on-the-job injury or OD, you may be required to select a physician or chiropractor from a list provided by your workers’ compensation insurer, if it has contracted with an Organization for Managed Care (MCO) or Preferred Provider Organization (PPO) or providers of health care. If your employer has not entered into a contract with an MCO or PPO, you may select a physician or chiropractor from the Panel " of Physicians and Chiropractors. Any medical costs related to your industrial injury or OD will be paid by your insurer.    Temporary Total Disability (TTD): If your doctor has certified that you are unable to work for a period of at least 5 consecutive days, or 5 cumulative days in a 20-day period, or places restrictions on you that your employer does not accommodate, you may be entitled to TTD compensation.    Temporary Partial Disability (TPD): If the wage you receive upon reemployment is less than the compensation for TTD to which you are entitled, the insurer may be required to pay you TPD compensation to make up the difference. TPD can only be paid for a maximum of 24 months.    Permanent Partial Disability (PPD): When your medical condition is stable and there is an indication of a PPD as a result of your injury or OD, within 30 days, your insurer must arrange for an evaluation by a rating physician or chiropractor to determine the degree of your PPD. The amount of your PPD award depends on the date of injury, the results of the PPD evaluation and your age and wage.    Permanent Total Disability (PTD): If you are medically certified by a treating physician or chiropractor as permanently and totally disabled and have been granted a PTD status by your insurer, you are entitled to receive monthly benefits not to exceed 66 2/3% of your average monthly wage. The amount of your PTD payments is subject to reduction if you previously received a PPD award.    Vocational Rehabilitation Services: You may be eligible for vocational rehabilitation services if you are unable to return to the job due to a permanent physical impairment or permanent restrictions as a result of your injury or occupational disease.    Transportation and Per Jessica Reimbursement: You may be eligible for travel expenses and per jessica associated with medical treatment.    Reopening: You may be able to reopen your claim if your condition worsens after  claim closure.    Appeal Process: If you disagree with a written determination issued by the insurer or the insurer does not respond to your request, you may appeal to the Department of Administration, , by following the instructions contained in your determination letter. You must appeal the determination within 70 days from the date of the determination letter at [address], [address], [place], [place] [address], or [address], [address], [place], [place] [address]. If you disagree with the  decision, you may appeal to the Department of Administration, . You must file your appeal within 30 days from the date of the  decision letter at [address], [address], [place], [place] [address], or [address], [place], [place] [address]. If you disagree with a decision of an , you may file a petition for judicial review with the District Court. You must do so within 30 days of the Appeal Officer’s decision. You may be represented by an  at your own expense or you may contact the [facility] for possible representation.    [place]  for Injured Workers (NAIW): If you disagree with a  decision, you may request that NAIW represent you without charge at an  Hearing. For information regarding denial of benefits, you may contact the [place] [facility] at: [address], [address], [place] [address], [phone]) [phone], or [address], [place] [address], [phone]    To File a Complaint with the Division: If you wish to file a complaint with the  of the Division of Industrial Relations (DIR),  please contact the Workers’ Compensation Section, [address], [place], [place] [address], telephone (861) [phone], or [address], [place], [place] [address], telephone [phone].    For assistance with  Workers’ Compensation Issues: You may contact the Office of the Governor Consumer Health Assistance, [address] [place] [address], [address] [facility] [address] [facility] [address], [place] [address], Toll Free 1-416.830.7468, Web site: http://govcha.[facility].[place]., E-mail [email or web address].[place].                   __________________________________________________________________                                                     _________        Employee Name / Signature                                                                                                                                              Date                                                                                                                                                                                                     D-2 (rev. 06/18)

## 2019-12-21 ENCOUNTER — OCCUPATIONAL MEDICINE (OUTPATIENT)
Dept: URGENT CARE | Facility: CLINIC | Age: 64
End: 2019-12-21
Payer: COMMERCIAL

## 2019-12-21 VITALS
OXYGEN SATURATION: 93 % | WEIGHT: 240 LBS | HEIGHT: 67 IN | DIASTOLIC BLOOD PRESSURE: 60 MMHG | SYSTOLIC BLOOD PRESSURE: 106 MMHG | TEMPERATURE: 98.6 F | BODY MASS INDEX: 37.67 KG/M2 | RESPIRATION RATE: 18 BRPM | HEART RATE: 87 BPM

## 2019-12-21 DIAGNOSIS — S86.912D STRAIN OF LEFT KNEE, SUBSEQUENT ENCOUNTER: ICD-10-CM

## 2019-12-21 PROCEDURE — 99213 OFFICE O/P EST LOW 20 MIN: CPT | Performed by: FAMILY MEDICINE

## 2019-12-21 NOTE — PROGRESS NOTES
"Subjective:      Aicha Choudhary is a 63 y.o. female who presents with Knee Pain (LEFT  WC/FV)      Date of Injury 12/10/2019.  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  The employee denies improvement  In the left and continues to complain of instability without the knee brace and  persistent intermittent pain in the medial aspect of the left knee.  The employee has not been working as there is no light duty available to the current work restrictions.      Knee Pain         Review of Systems   All other systems reviewed and are negative.         Objective:     /60 (BP Location: Left arm, Patient Position: Sitting, BP Cuff Size: Adult long)   Pulse 87   Temp 37 °C (98.6 °F) (Temporal)   Resp 18   Ht 1.702 m (5' 7\")   Wt 108.9 kg (240 lb)   SpO2 93%   BMI 37.59 kg/m²      Physical Exam  Vitals signs and nursing note reviewed.   Constitutional:       Appearance: Normal appearance.   HENT:      Head: Normocephalic.   Eyes:      Conjunctiva/sclera: Conjunctivae normal.   Cardiovascular:      Rate and Rhythm: Normal rate and regular rhythm.   Pulmonary:      Effort: Pulmonary effort is normal.   Abdominal:      General: Abdomen is flat.      Palpations: Abdomen is soft.   Neurological:      General: No focal deficit present.      Mental Status: She is alert and oriented to person, place, and time.         Left knee: extension to 10 degrees, flexion to 90 degrees, limitations from pain, tenderness to palpation medial aspect, laxity and guarding noted with valgus stress, no bony point tenderness, limping while wearing brace       Assessment/Plan:       1. Strain of left knee, subsequent encounter  See D-39 form    Follow up in Occupational Health clinic    "

## 2019-12-21 NOTE — PATIENT INSTRUCTIONS
Knee Pain  Knee pain is a very common symptom and can have many causes. Knee pain often goes away when you follow your health care provider's instructions for relieving pain and discomfort at home. However, knee pain can develop into a condition that needs treatment. Some conditions may include:  · Arthritis caused by wear and tear (osteoarthritis).  · Arthritis caused by swelling and irritation (rheumatoid arthritis or gout).  · A cyst or growth in your knee.  · An infection in your knee joint.  · An injury that will not heal.  · Damage, swelling, or irritation of the tissues that support your knee (torn ligaments or tendinitis).  If your knee pain continues, additional tests may be ordered to diagnose your condition. Tests may include X-rays or other imaging studies of your knee. You may also need to have fluid removed from your knee. Treatment for ongoing knee pain depends on the cause, but treatment may include:  · Medicines to relieve pain or swelling.  · Steroid injections in your knee.  · Physical therapy.  · Surgery.  HOME CARE INSTRUCTIONS  · Take medicines only as directed by your health care provider.  · Rest your knee and keep it raised (elevated) while you are resting.  · Do not do things that cause or worsen pain.  · Avoid high-impact activities or exercises, such as running, jumping rope, or doing jumping jacks.  · Apply ice to the knee area:  ¨ Put ice in a plastic bag.  ¨ Place a towel between your skin and the bag.  ¨ Leave the ice on for 20 minutes, 2-3 times a day.  · Ask your health care provider if you should wear an elastic knee support.  · Keep a pillow under your knee when you sleep.  · Lose weight if you are overweight. Extra weight can put pressure on your knee.  · Do not use any tobacco products, including cigarettes, chewing tobacco, or electronic cigarettes. If you need help quitting, ask your health care provider. Smoking may slow the healing of any bone and joint problems that you may  have.  SEEK MEDICAL CARE IF:  · Your knee pain continues, changes, or gets worse.  · You have a fever along with knee pain.  · Your knee zev or locks up.  · Your knee becomes more swollen.  SEEK IMMEDIATE MEDICAL CARE IF:   · Your knee joint feels hot to the touch.  · You have chest pain or trouble breathing.  This information is not intended to replace advice given to you by your health care provider. Make sure you discuss any questions you have with your health care provider.  Document Released: 10/14/2008 Document Revised: 01/08/2016 Document Reviewed: 08/03/2015  Elsevier Interactive Patient Education © 2017 Elsevier Inc.

## 2019-12-30 ENCOUNTER — OCCUPATIONAL MEDICINE (OUTPATIENT)
Dept: URGENT CARE | Facility: CLINIC | Age: 64
End: 2019-12-30
Payer: COMMERCIAL

## 2019-12-30 ENCOUNTER — APPOINTMENT (OUTPATIENT)
Dept: RADIOLOGY | Facility: IMAGING CENTER | Age: 64
End: 2019-12-30
Attending: PHYSICIAN ASSISTANT
Payer: COMMERCIAL

## 2019-12-30 VITALS
TEMPERATURE: 97.4 F | SYSTOLIC BLOOD PRESSURE: 160 MMHG | OXYGEN SATURATION: 94 % | BODY MASS INDEX: 40.02 KG/M2 | RESPIRATION RATE: 20 BRPM | HEART RATE: 74 BPM | WEIGHT: 255 LBS | HEIGHT: 67 IN | DIASTOLIC BLOOD PRESSURE: 94 MMHG

## 2019-12-30 DIAGNOSIS — S86.912D STRAIN OF LEFT KNEE, SUBSEQUENT ENCOUNTER: ICD-10-CM

## 2019-12-30 DIAGNOSIS — S83.412D SPRAIN OF MEDIAL COLLATERAL LIGAMENT OF LEFT KNEE, SUBSEQUENT ENCOUNTER: ICD-10-CM

## 2019-12-30 PROCEDURE — 99214 OFFICE O/P EST MOD 30 MIN: CPT | Mod: 29 | Performed by: PHYSICIAN ASSISTANT

## 2019-12-30 PROCEDURE — 73564 X-RAY EXAM KNEE 4 OR MORE: CPT | Mod: TC,LT,29 | Performed by: PHYSICIAN ASSISTANT

## 2019-12-30 RX ORDER — KETOROLAC TROMETHAMINE 30 MG/ML
30 INJECTION, SOLUTION INTRAMUSCULAR; INTRAVENOUS ONCE
Status: COMPLETED | OUTPATIENT
Start: 2019-12-30 | End: 2019-12-30

## 2019-12-30 RX ADMIN — KETOROLAC TROMETHAMINE 30 MG: 30 INJECTION, SOLUTION INTRAMUSCULAR; INTRAVENOUS at 09:36

## 2019-12-30 ASSESSMENT — ENCOUNTER SYMPTOMS
TINGLING: 0
FALLS: 1
FOCAL WEAKNESS: 0
SENSORY CHANGE: 0

## 2019-12-30 ASSESSMENT — PAIN SCALES - GENERAL: PAINLEVEL: 7=MODERATE-SEVERE PAIN

## 2019-12-30 NOTE — LETTER
"   [facility] [place]  [address] - [place] [address]  Phone:  [phone] - Fax:  [phone]   [facility] Progress Report and Disability Certification  Date of Service: 12/30/2019   No Show:  No  Date / Time of Next Visit: 1/6/2020   Claim Information   Patient Name: Aicha Choudhary  Claim Number:     Employer: [employer] Date of Injury: 12/10/2019     Insurer / TPA: [employer]  ID / SSN:     Occupation: Production  Diagnosis: Diagnoses of Strain of left knee, subsequent encounter and Sprain of medial collateral ligament of left knee, subsequent encounter were pertinent to this visit.    Medical Information   Related to Industrial Injury? Yes    Subjective Complaints:  DOI: 12/10/19. Patient returns today for 2nd follow up visit( 3rd visit total) for continued left knee pain. On the date of injury, patient slipped and fell on ice, twisting the left knee. Patient was seen in urgent care for an initial visit on 12/16/19 diagnosed with knee strain and referred to orthopedics.  Patient was seen again in follow-up on 12/21/19 with recommendation to follow-up with occupational health for next visit.  Patient presents today for reevaluation with continued pain in the left knee.  Patient reports sensation of knee being \"unstable\".  Patient reports pain is rated at \"severe\" and kept her awake most of the night.  She does have an appointment scheduled with orthopedics on January 8.  Patient is under pain contract with pain management for other chronic pain syndrome for which she takes oxycodone.  She is also been taking ibuprofen with last dose yesterday.   Objective Findings: Left knee: Moderate soft tissue swelling, no ecchymosis or deformity noted.  Previous surgical scar noted lateral aspect of knee.  Tender to palpation along medial joint line and along MCL in particular along the distal insertion. + MCL laxity noted. 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    Disability Information   Status: Released to Restricted Duty    From:  2019  Through: 2020 or once scheduled with occupational health Restrictions are: Temporary   Physical Restrictions   Sitting:    Standin hrs/day Stooping:    Bending:      Squattin hrs/day Walkin hrs/day Climbin hrs/day Pushing:      Pulling:    Other:    Reaching Above Shoulder (L):   Reaching Above Shoulder (R):       Reaching Below Shoulder (L):    Reaching Below Shoulder (R):      Not to exceed Weight Limits   Carrying(hrs): 0 Weight Limit(lb):   Lifting(hrs): 0 Weight  Limit(lb):     Comments: Given the amount of pain and tenderness she is experiencing with MCL laxity, xray is obtained.  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  Patient is given Toradol 30 mg IM here in clinic.  She may continue her previous prescriptions of oxycodone prescribed by outside provider for chronic pain per their recommendations and is instructed not to take these at work.  She may continue over-the-counter ibuprofen not to exceed recommended daily dose.  Referral placed for follow-up with occupational health.  Keep appointment as scheduled with orthopedics.  Patient is noted to be wearing a hinged knee brace very low and nonfunctional.  Proper wear and use is reviewed in detail with patient and brace is placed accordingly in clinic today.    Repetitive Actions   Hands: i.e. Fine Manipulations from Grasping:     Feet: i.e. Operating Foot Controls: 0 hrs/day  Comments:left leg   Driving /  Operate Machinery:     Physician Name: Dori Boss P.A.-C. Physician Signature:   e-Signature: Dr. Connor Aldana, Medical Director   Clinic Name / Location: [address] [place] [address] [place] [address] Clinic Phone Number: Dept: [phone]   Appointment Time: 8:00 Am Visit Start Time: 8:10 AM   Check-In Time:  8:02 Am Visit Discharge Time:  9:05 AM   Original-Treating Physician or Chiropractor    Page 2-Insurer/TPA    Page 3-Employer    Page 4-Employee

## 2019-12-30 NOTE — PROGRESS NOTES
"Subjective:   Aicha Choudhary  is a 64 y.o. female who presents for Follow-Up (Left knee, DOI: 12/10/19, very painful, scared to fall)    DOI: 12/10/19. Patient returns today for 2nd follow up visit( 3rd visit total) for continued left knee pain. On the date of injury, patient slipped and fell on ice, twisting the left knee. Patient was seen in urgent care for an initial visit on 12/16/19 diagnosed with knee strain and referred to orthopedics.  Patient was seen again in follow-up on 12/21/19 with recommendation to follow-up with occupational health for next visit.  Patient presents today for reevaluation with continued pain in the left knee.  Patient reports sensation of knee being \"unstable\".  Patient reports pain is rated at \"severe\" and kept her awake most of the night.  She does have an appointment scheduled with orthopedics on January 8.  Patient is under pain contract with pain management for other chronic pain syndrome for which she takes oxycodone.  She is also been taking ibuprofen with last dose yesterday.   HPI  Review of Systems   Musculoskeletal: Positive for falls and joint pain.   Neurological: Negative for tingling, sensory change and focal weakness.   All other systems reviewed and are negative.    Allergies   Allergen Reactions   • Keflex    • Penicillins    • Tramadol      Rash/hives     Reviewed past medical, surgical , social and family history.  Reviewed prescription and over-the-counter medications with patient and electronic health record today.     Objective:   /94 (BP Location: Right arm, Patient Position: Sitting, BP Cuff Size: Adult long)   Pulse 74   Temp 36.3 °C (97.4 °F) (Temporal)   Resp 20   Ht 1.702 m (5' 7\")   Wt 115.7 kg (255 lb)   SpO2 94%   BMI 39.94 kg/m²   Physical Exam  Vitals signs reviewed.   Constitutional:       Appearance: She is well-developed.   HENT:      Head: Normocephalic and atraumatic.      Right Ear: External ear normal.      Left Ear: External " ear normal.      Nose: Nose normal.      Mouth/Throat:      Mouth: Mucous membranes are moist.   Eyes:      Extraocular Movements: Extraocular movements intact.      Conjunctiva/sclera: Conjunctivae normal.      Pupils: Pupils are equal, round, and reactive to light.   Neck:      Musculoskeletal: Normal range of motion and neck supple.   Cardiovascular:      Rate and Rhythm: Normal rate and regular rhythm.      Pulses:           Dorsalis pedis pulses are 2+ on the left side.        Posterior tibial pulses are 2+ on the left side.      Heart sounds: Normal heart sounds.   Pulmonary:      Effort: Pulmonary effort is normal.      Breath sounds: Normal breath sounds.   Musculoskeletal:      Left knee: She exhibits decreased range of motion, swelling, bony tenderness and MCL laxity. She exhibits no effusion, no ecchymosis, no deformity, no erythema, normal alignment and no LCL laxity. Tenderness found. Medial joint line and MCL tenderness noted.   Lymphadenopathy:      Cervical: No cervical adenopathy.   Skin:     General: Skin is warm and dry.      Findings: No rash.   Neurological:      Mental Status: She is alert and oriented to person, place, and time.      Cranial Nerves: Cranial nerves are intact.      Sensory: Sensation is intact.      Motor: Motor function is intact.      Coordination: Coordination is intact.      Comments: Antalgic gait   Psychiatric:         Attention and Perception: Attention normal.         Mood and Affect: Mood normal.         Speech: Speech normal.         Behavior: Behavior normal.         Thought Content: Thought content normal.         Judgment: Judgment normal.       Left knee: Moderate soft tissue swelling, no ecchymosis or deformity noted.  Previous surgical scar noted lateral aspect of knee.  Tender to palpation along medial joint line and along MCL in particular along the distal insertion. + MCL laxity noted. Distal NV intact   Assessment/Plan:     1. Strain of left knee, subsequent  encounter  - DX-KNEE COMPLETE 4+ LEFT; Future  - ketorolac (TORADOL) injection 30 mg  - REFERRAL TO OCCUPATIONAL MEDICINE    2. Sprain of medial collateral ligament of left knee, subsequent encounter  - DX-KNEE COMPLETE 4+ LEFT; Future  - ketorolac (TORADOL) injection 30 mg  - REFERRAL TO OCCUPATIONAL MEDICINE    Given the amount of pain and tenderness she is experiencing with MCL laxity, xray is obtained.  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  Patient is given Toradol 30 mg IM here in clinic.  She may continue her previous prescriptions of oxycodone prescribed by outside provider for chronic pain per their recommendations and is instructed not to take these at work.  She may continue over-the-counter ibuprofen not to exceed recommended daily dose.  Referral placed for follow-up with occupational health.  Keep appointment as scheduled with orthopedics.  Patient is noted to be wearing a hinged knee brace very low and nonfunctional.  Proper wear and use is reviewed in detail with patient and brace is placed accordingly in clinic today.  Differential diagnosis, natural history, supportive care, and indications for immediate follow-up discussed.     Red flag warning symptoms and strict ER/follow-up precautions given.  The patient demonstrated a good understanding and agreed with the treatment plan.  Please note that this note was created using voice recognition speech to text software. Every effort has been made to correct obvious errors.  However, I expect there are errors of grammar and possibly context that were not discovered prior to finalizing the note  JASON Boss PA-C

## 2020-02-10 ENCOUNTER — OFFICE VISIT (OUTPATIENT)
Dept: URGENT CARE | Facility: CLINIC | Age: 65
End: 2020-02-10
Payer: COMMERCIAL

## 2020-02-10 ENCOUNTER — APPOINTMENT (OUTPATIENT)
Dept: RADIOLOGY | Facility: IMAGING CENTER | Age: 65
End: 2020-02-10
Attending: NURSE PRACTITIONER
Payer: COMMERCIAL

## 2020-02-10 VITALS
WEIGHT: 248 LBS | HEART RATE: 84 BPM | TEMPERATURE: 98 F | BODY MASS INDEX: 38.84 KG/M2 | DIASTOLIC BLOOD PRESSURE: 80 MMHG | OXYGEN SATURATION: 95 % | SYSTOLIC BLOOD PRESSURE: 140 MMHG | RESPIRATION RATE: 18 BRPM

## 2020-02-10 DIAGNOSIS — H65.193 OTHER NON-RECURRENT ACUTE NONSUPPURATIVE OTITIS MEDIA OF BOTH EARS: ICD-10-CM

## 2020-02-10 DIAGNOSIS — S69.91XA HAND INJURY, RIGHT, INITIAL ENCOUNTER: ICD-10-CM

## 2020-02-10 PROCEDURE — 99214 OFFICE O/P EST MOD 30 MIN: CPT | Performed by: NURSE PRACTITIONER

## 2020-02-10 PROCEDURE — 73130 X-RAY EXAM OF HAND: CPT | Mod: TC,FY,RT | Performed by: NURSE PRACTITIONER

## 2020-02-10 RX ORDER — DOXYCYCLINE HYCLATE 100 MG
100 TABLET ORAL 2 TIMES DAILY
Qty: 10 TAB | Refills: 0 | Status: SHIPPED | OUTPATIENT
Start: 2020-02-10 | End: 2020-02-15

## 2020-02-10 ASSESSMENT — ENCOUNTER SYMPTOMS
HEADACHES: 1
PALPITATIONS: 0
NAUSEA: 0
SENSORY CHANGE: 0
SHORTNESS OF BREATH: 0
TINGLING: 0
EYE DISCHARGE: 0
SORE THROAT: 1
SPUTUM PRODUCTION: 0
FALLS: 0
FEVER: 0
EYE REDNESS: 0
CHILLS: 0
WHEEZING: 0
VOMITING: 0
COUGH: 1
STRIDOR: 0
MYALGIAS: 0

## 2020-02-11 NOTE — PROGRESS NOTES
Subjective:   Aicha Choudhary is a 64 y.o. female who presents for Hand Injury (Hurt right hand couple days ago, ring finger painful.)          Cough   This is a new problem. The current episode started 1 to 4 weeks ago. The problem has been unchanged. The problem occurs every few minutes. The cough is non-productive. Associated symptoms include ear pain (Bilateral), headaches, nasal congestion and a sore throat. Pertinent negatives include no chest pain, chills, eye redness, fever, myalgias, rash, shortness of breath or wheezing. She has tried nothing for the symptoms. The treatment provided no relief.   Hand Injury   This is a new (Right hand. Does not recall hurting her hand but noticed right ring finger with bruising and difficult ROM) problem. The current episode started in the past 7 days. The problem occurs constantly. The problem has been unchanged. Associated symptoms include congestion, coughing, headaches and a sore throat. Pertinent negatives include no chest pain, chills, fever, myalgias, nausea, rash or vomiting. She has tried acetaminophen and oral narcotics for the symptoms. The treatment provided mild relief.        Review of Systems   Constitutional: Negative for chills and fever.   HENT: Positive for congestion, ear pain (Bilateral) and sore throat. Negative for ear discharge.    Eyes: Negative for discharge and redness.   Respiratory: Positive for cough. Negative for sputum production, shortness of breath, wheezing and stridor.    Cardiovascular: Negative for chest pain and palpitations.   Gastrointestinal: Negative for nausea and vomiting.   Musculoskeletal: Positive for joint pain (Right ring finger). Negative for falls and myalgias.   Skin: Negative for itching and rash.   Neurological: Positive for headaches. Negative for tingling and sensory change.   All other systems reviewed and are negative.    PMH:  has a past medical history of Depression and hysterectomy.  ALLERGIES:   Allergies    Allergen Reactions   • Keflex    • Penicillins    • Tramadol      Rash/hives       Patient's PMH, SocHx, SurgHx, FamHx, Drug allergies and medications reviewed.     Objective:   /80   Pulse 84   Temp 36.7 °C (98 °F) (Temporal)   Resp 18   Wt 112.5 kg (248 lb)   SpO2 95%   BMI 38.84 kg/m²   Physical Exam  Vitals signs reviewed.   Constitutional:       Appearance: She is well-developed.   HENT:      Head: Normocephalic.      Right Ear: Hearing and ear canal normal. No middle ear effusion. Tympanic membrane is erythematous. Tympanic membrane is not perforated. Tympanic membrane has decreased mobility.      Left Ear: Hearing and ear canal normal.  No middle ear effusion. Tympanic membrane is erythematous. Tympanic membrane is not perforated.      Nose: Congestion present. No rhinorrhea.      Right Sinus: No maxillary sinus tenderness or frontal sinus tenderness.      Left Sinus: No maxillary sinus tenderness or frontal sinus tenderness.      Mouth/Throat:      Lips: Pink.      Mouth: Mucous membranes are moist.      Pharynx: Oropharynx is clear. Uvula midline. Posterior oropharyngeal erythema present. No oropharyngeal exudate or uvula swelling.      Tonsils: No tonsillar exudate.   Eyes:      General: Lids are normal.      Extraocular Movements: Extraocular movements intact.      Conjunctiva/sclera: Conjunctivae normal.      Pupils: Pupils are equal, round, and reactive to light.   Neck:      Musculoskeletal: Normal range of motion.      Thyroid: No thyromegaly.   Cardiovascular:      Rate and Rhythm: Normal rate and regular rhythm.      Heart sounds: Normal heart sounds.   Pulmonary:      Effort: Pulmonary effort is normal. No respiratory distress.      Breath sounds: Normal breath sounds. No wheezing.   Musculoskeletal:      Right hand: She exhibits decreased range of motion, tenderness, deformity and swelling. She exhibits normal capillary refill.        Hands:    Lymphadenopathy:      Head:      Right  "side of head: No submandibular or tonsillar adenopathy.      Left side of head: No submandibular or tonsillar adenopathy.   Skin:     General: Skin is warm and dry.      Findings: Bruising present. No rash.      Comments: Mild bruising to the right ring finger   Neurological:      General: No focal deficit present.      Mental Status: She is alert and oriented to person, place, and time.      GCS: GCS eye subscore is 4. GCS verbal subscore is 5. GCS motor subscore is 6.      Cranial Nerves: Cranial nerves are intact.      Sensory: Sensation is intact.      Motor: Motor function is intact.      Coordination: Coordination is intact.      Comments: Neurovascular and sensation intact     Psychiatric:         Mood and Affect: Mood normal.         Speech: Speech normal.         Behavior: Behavior normal. Behavior is cooperative.         Thought Content: Thought content normal.         Judgment: Judgment normal.           Assessment/Plan:   Assessment    1. Hand injury, right, initial encounter  DX-HAND 3+ RIGHT    REFERRAL TO SPORTS MEDICINE   2. Other non-recurrent acute nonsuppurative otitis media of both ears  doxycycline (VIBRAMYCIN) 100 MG Tab     Xray;\"1.  No acute findings.   2.  Productive osteoarthropathy of the interphalangeal, triscaphe, and first carpometacarpal joints. Small erosions in the base of the fifth metacarpal and ulnar styloid raises the possibility of an inflammatory arthropathy in the appropriate clinical   Setting.\"  Rest and elevate the affected area with pillows.  You may apply ice packs to the affected area up to 4 times daily for 30 minutes at a time  You may apply compression to the affected area by wrapping with an ace bandage  Referral to Sports Medicine if pain continues    Differential diagnosis, natural history, supportive care, and indications for immediate follow-up discussed.     **Please note that all invasive procedures during this visit were performed by myself and/or the Medical " Assistant under the supervision of the PA or MD in office**

## 2020-02-16 ENCOUNTER — HOSPITAL ENCOUNTER (INPATIENT)
Dept: HOSPITAL 8 - ED | Age: 65
LOS: 9 days | Discharge: HOME | DRG: 253 | End: 2020-02-25
Attending: HOSPITALIST | Admitting: INTERNAL MEDICINE
Payer: COMMERCIAL

## 2020-02-16 VITALS — BODY MASS INDEX: 40.24 KG/M2 | HEIGHT: 67 IN | WEIGHT: 256.4 LBS

## 2020-02-16 VITALS — DIASTOLIC BLOOD PRESSURE: 70 MMHG | SYSTOLIC BLOOD PRESSURE: 111 MMHG

## 2020-02-16 VITALS — SYSTOLIC BLOOD PRESSURE: 143 MMHG | DIASTOLIC BLOOD PRESSURE: 82 MMHG

## 2020-02-16 DIAGNOSIS — F11.20: ICD-10-CM

## 2020-02-16 DIAGNOSIS — Z88.0: ICD-10-CM

## 2020-02-16 DIAGNOSIS — J98.11: ICD-10-CM

## 2020-02-16 DIAGNOSIS — H66.90: ICD-10-CM

## 2020-02-16 DIAGNOSIS — Z87.891: ICD-10-CM

## 2020-02-16 DIAGNOSIS — I77.9: ICD-10-CM

## 2020-02-16 DIAGNOSIS — E86.0: ICD-10-CM

## 2020-02-16 DIAGNOSIS — Z80.3: ICD-10-CM

## 2020-02-16 DIAGNOSIS — E66.01: ICD-10-CM

## 2020-02-16 DIAGNOSIS — I70.208: Primary | ICD-10-CM

## 2020-02-16 DIAGNOSIS — Z88.8: ICD-10-CM

## 2020-02-16 DIAGNOSIS — G89.29: ICD-10-CM

## 2020-02-16 DIAGNOSIS — I10: ICD-10-CM

## 2020-02-16 DIAGNOSIS — Z79.01: ICD-10-CM

## 2020-02-16 DIAGNOSIS — Z51.5: ICD-10-CM

## 2020-02-16 LAB
ALBUMIN SERPL-MCNC: 3 G/DL (ref 3.4–5)
ALP SERPL-CCNC: 107 U/L (ref 45–117)
ALT SERPL-CCNC: 28 U/L (ref 12–78)
ANION GAP SERPL CALC-SCNC: 6 MMOL/L (ref 5–15)
APTT BLD: 25 SECONDS (ref 25–31)
BASOPHILS # BLD AUTO: 0.02 X10^3/UL (ref 0–0.1)
BASOPHILS NFR BLD AUTO: 0 % (ref 0–1)
BILIRUB SERPL-MCNC: 0.9 MG/DL (ref 0.2–1)
CALCIUM SERPL-MCNC: 8 MG/DL (ref 8.5–10.1)
CHLORIDE SERPL-SCNC: 105 MMOL/L (ref 98–107)
CREAT SERPL-MCNC: 0.97 MG/DL (ref 0.55–1.02)
EOSINOPHIL # BLD AUTO: 0.08 X10^3/UL (ref 0–0.4)
EOSINOPHIL NFR BLD AUTO: 1 % (ref 1–7)
ERYTHROCYTE [DISTWIDTH] IN BLOOD BY AUTOMATED COUNT: 14.2 % (ref 9.6–15.2)
INR PPP: 0.93 (ref 0.93–1.1)
LYMPHOCYTES # BLD AUTO: 0.99 X10^3/UL (ref 1–3.4)
LYMPHOCYTES NFR BLD AUTO: 10 % (ref 22–44)
MCH RBC QN AUTO: 30.2 PG (ref 27–34.8)
MCHC RBC AUTO-ENTMCNC: 32.1 G/DL (ref 32.4–35.8)
MCV RBC AUTO: 94.3 FL (ref 80–100)
MD: (no result)
MONOCYTES # BLD AUTO: 0.49 X10^3/UL (ref 0.2–0.8)
MONOCYTES NFR BLD AUTO: 5 % (ref 2–9)
NEUTROPHILS # BLD AUTO: 8.84 X10^3/UL (ref 1.8–6.8)
NEUTROPHILS NFR BLD AUTO: 85 % (ref 42–75)
PLATELET # BLD AUTO: 195 X10^3/UL (ref 130–400)
PMV BLD AUTO: 9.3 FL (ref 7.4–10.4)
PROT SERPL-MCNC: 6.5 G/DL (ref 6.4–8.2)
PROTHROMBIN TIME: 9.8 SECONDS (ref 9.6–11.5)
RBC # BLD AUTO: 5.68 X10^6/UL (ref 3.82–5.3)

## 2020-02-16 PROCEDURE — 86147 CARDIOLIPIN ANTIBODY EA IG: CPT

## 2020-02-16 PROCEDURE — 85670 THROMBIN TIME PLASMA: CPT

## 2020-02-16 PROCEDURE — 85306 CLOT INHIBIT PROT S FREE: CPT

## 2020-02-16 PROCEDURE — 96374 THER/PROPH/DIAG INJ IV PUSH: CPT

## 2020-02-16 PROCEDURE — 85300 ANTITHROMBIN III ACTIVITY: CPT

## 2020-02-16 PROCEDURE — 99285 EMERGENCY DEPT VISIT HI MDM: CPT

## 2020-02-16 PROCEDURE — 85303 CLOT INHIBIT PROT C ACTIVITY: CPT

## 2020-02-16 PROCEDURE — 80053 COMPREHEN METABOLIC PANEL: CPT

## 2020-02-16 PROCEDURE — 75710 ARTERY X-RAYS ARM/LEG: CPT

## 2020-02-16 PROCEDURE — 85613 RUSSELL VIPER VENOM DILUTED: CPT

## 2020-02-16 PROCEDURE — 85598 HEXAGNAL PHOSPH PLTLT NEUTRL: CPT

## 2020-02-16 PROCEDURE — 80048 BASIC METABOLIC PNL TOTAL CA: CPT

## 2020-02-16 PROCEDURE — 85301 ANTITHROMBIN III ANTIGEN: CPT

## 2020-02-16 PROCEDURE — 93306 TTE W/DOPPLER COMPLETE: CPT

## 2020-02-16 PROCEDURE — 96372 THER/PROPH/DIAG INJ SC/IM: CPT

## 2020-02-16 PROCEDURE — C1757 CATH, THROMBECTOMY/EMBOLECT: HCPCS

## 2020-02-16 PROCEDURE — 85610 PROTHROMBIN TIME: CPT

## 2020-02-16 PROCEDURE — 86146 BETA-2 GLYCOPROTEIN ANTIBODY: CPT

## 2020-02-16 PROCEDURE — 93005 ELECTROCARDIOGRAM TRACING: CPT

## 2020-02-16 PROCEDURE — 96375 TX/PRO/DX INJ NEW DRUG ADDON: CPT

## 2020-02-16 PROCEDURE — 36415 COLL VENOUS BLD VENIPUNCTURE: CPT

## 2020-02-16 PROCEDURE — 93931 UPPER EXTREMITY STUDY: CPT

## 2020-02-16 PROCEDURE — 85025 COMPLETE CBC W/AUTO DIFF WBC: CPT

## 2020-02-16 PROCEDURE — 85520 HEPARIN ASSAY: CPT

## 2020-02-16 PROCEDURE — 71275 CT ANGIOGRAPHY CHEST: CPT

## 2020-02-16 PROCEDURE — 85730 THROMBOPLASTIN TIME PARTIAL: CPT

## 2020-02-16 PROCEDURE — 85732 THROMBOPLASTIN TIME PARTIAL: CPT

## 2020-02-16 RX ADMIN — HYDROMORPHONE HYDROCHLORIDE PRN MG: 2 INJECTION INTRAMUSCULAR; INTRAVENOUS; SUBCUTANEOUS at 12:00

## 2020-02-16 RX ADMIN — HYDROMORPHONE HYDROCHLORIDE PRN MG: 2 INJECTION INTRAMUSCULAR; INTRAVENOUS; SUBCUTANEOUS at 19:50

## 2020-02-16 RX ADMIN — HYDROMORPHONE HYDROCHLORIDE PRN MG: 2 INJECTION INTRAMUSCULAR; INTRAVENOUS; SUBCUTANEOUS at 23:46

## 2020-02-16 RX ADMIN — HEPARIN SODIUM PRN MLS/HR: 10000 INJECTION, SOLUTION INTRAVENOUS at 12:13

## 2020-02-16 RX ADMIN — SODIUM CHLORIDE SCH MLS/HR: 0.9 INJECTION, SOLUTION INTRAVENOUS at 23:35

## 2020-02-16 RX ADMIN — SODIUM CHLORIDE SCH MLS/HR: 0.9 INJECTION, SOLUTION INTRAVENOUS at 22:01

## 2020-02-16 RX ADMIN — HYDROMORPHONE HYDROCHLORIDE PRN MG: 2 INJECTION INTRAMUSCULAR; INTRAVENOUS; SUBCUTANEOUS at 15:12

## 2020-02-16 RX ADMIN — HEPARIN SODIUM PRN UNITS: 5000 INJECTION, SOLUTION INTRAVENOUS; SUBCUTANEOUS at 20:20

## 2020-02-16 NOTE — NUR
THIS IS A 65 YO F W/ C/O 2ND AND 4TH RIGHT FINGER PAIN AND DISCOLORATION. PT 
REPORTS THIS STARTED LAST SATURDAY, SEEN BY  FOR EAR INFT ON MONDAY, HAD LABS 
DONE MONDAY WHICH SHE REPORTS WERE NEGATIVE. RESP EVEN AND UNLABORED. IGNACIA BRUNO IN ROOM FOR EVAL. AWAITING ORDERS.

## 2020-02-17 VITALS — SYSTOLIC BLOOD PRESSURE: 148 MMHG | DIASTOLIC BLOOD PRESSURE: 72 MMHG

## 2020-02-17 VITALS — SYSTOLIC BLOOD PRESSURE: 123 MMHG | DIASTOLIC BLOOD PRESSURE: 67 MMHG

## 2020-02-17 VITALS — DIASTOLIC BLOOD PRESSURE: 72 MMHG | SYSTOLIC BLOOD PRESSURE: 128 MMHG

## 2020-02-17 VITALS — DIASTOLIC BLOOD PRESSURE: 66 MMHG | SYSTOLIC BLOOD PRESSURE: 101 MMHG

## 2020-02-17 VITALS — SYSTOLIC BLOOD PRESSURE: 122 MMHG | DIASTOLIC BLOOD PRESSURE: 62 MMHG

## 2020-02-17 LAB
ANION GAP SERPL CALC-SCNC: 5 MMOL/L (ref 5–15)
BASOPHILS # BLD AUTO: 0.04 X10^3/UL (ref 0–0.1)
BASOPHILS NFR BLD AUTO: 1 % (ref 0–1)
CALCIUM SERPL-MCNC: 7.8 MG/DL (ref 8.5–10.1)
CHLORIDE SERPL-SCNC: 107 MMOL/L (ref 98–107)
CREAT SERPL-MCNC: 0.75 MG/DL (ref 0.55–1.02)
EOSINOPHIL # BLD AUTO: 0.13 X10^3/UL (ref 0–0.4)
EOSINOPHIL NFR BLD AUTO: 2 % (ref 1–7)
ERYTHROCYTE [DISTWIDTH] IN BLOOD BY AUTOMATED COUNT: 14.3 % (ref 9.6–15.2)
LYMPHOCYTES # BLD AUTO: 1.25 X10^3/UL (ref 1–3.4)
LYMPHOCYTES NFR BLD AUTO: 17 % (ref 22–44)
MCH RBC QN AUTO: 30.5 PG (ref 27–34.8)
MCHC RBC AUTO-ENTMCNC: 32.1 G/DL (ref 32.4–35.8)
MCV RBC AUTO: 94.9 FL (ref 80–100)
MD: NO
MONOCYTES # BLD AUTO: 0.38 X10^3/UL (ref 0.2–0.8)
MONOCYTES NFR BLD AUTO: 5 % (ref 2–9)
NEUTROPHILS # BLD AUTO: 5.5 X10^3/UL (ref 1.8–6.8)
NEUTROPHILS NFR BLD AUTO: 75 % (ref 42–75)
PLATELET # BLD AUTO: 161 X10^3/UL (ref 130–400)
PMV BLD AUTO: 9.5 FL (ref 7.4–10.4)
RBC # BLD AUTO: 5.33 X10^6/UL (ref 3.82–5.3)

## 2020-02-17 PROCEDURE — 03CB0ZZ EXTIRPATION OF MATTER FROM RIGHT RADIAL ARTERY, OPEN APPROACH: ICD-10-PCS | Performed by: SURGERY

## 2020-02-17 PROCEDURE — 3E0T3BZ INTRODUCTION OF ANESTHETIC AGENT INTO PERIPHERAL NERVES AND PLEXI, PERCUTANEOUS APPROACH: ICD-10-PCS

## 2020-02-17 PROCEDURE — 03C90ZZ EXTIRPATION OF MATTER FROM RIGHT ULNAR ARTERY, OPEN APPROACH: ICD-10-PCS | Performed by: SURGERY

## 2020-02-17 RX ADMIN — HYDROMORPHONE HYDROCHLORIDE PRN MG: 2 INJECTION INTRAMUSCULAR; INTRAVENOUS; SUBCUTANEOUS at 15:59

## 2020-02-17 RX ADMIN — HEPARIN SODIUM PRN UNITS: 5000 INJECTION, SOLUTION INTRAVENOUS; SUBCUTANEOUS at 03:07

## 2020-02-17 RX ADMIN — HYDROMORPHONE HYDROCHLORIDE PRN MG: 2 INJECTION INTRAMUSCULAR; INTRAVENOUS; SUBCUTANEOUS at 16:09

## 2020-02-17 RX ADMIN — FENTANYL CITRATE PRN MCG: 50 INJECTION INTRAMUSCULAR; INTRAVENOUS at 15:35

## 2020-02-17 RX ADMIN — LORAZEPAM PRN MG: 2 INJECTION INTRAMUSCULAR; INTRAVENOUS at 16:39

## 2020-02-17 RX ADMIN — HYDROMORPHONE HYDROCHLORIDE PRN MG: 2 INJECTION INTRAMUSCULAR; INTRAVENOUS; SUBCUTANEOUS at 22:50

## 2020-02-17 RX ADMIN — FENTANYL CITRATE PRN MCG: 50 INJECTION INTRAMUSCULAR; INTRAVENOUS at 15:23

## 2020-02-17 RX ADMIN — HYDROMORPHONE HYDROCHLORIDE PRN MG: 2 INJECTION INTRAMUSCULAR; INTRAVENOUS; SUBCUTANEOUS at 16:32

## 2020-02-17 RX ADMIN — FENTANYL CITRATE PRN MCG: 50 INJECTION INTRAMUSCULAR; INTRAVENOUS at 16:12

## 2020-02-17 RX ADMIN — HYDROMORPHONE HYDROCHLORIDE PRN MG: 2 INJECTION INTRAMUSCULAR; INTRAVENOUS; SUBCUTANEOUS at 03:07

## 2020-02-17 RX ADMIN — FENTANYL CITRATE PRN MCG: 50 INJECTION INTRAMUSCULAR; INTRAVENOUS at 16:17

## 2020-02-17 RX ADMIN — SODIUM CHLORIDE SCH MLS/HR: 0.9 INJECTION, SOLUTION INTRAVENOUS at 06:12

## 2020-02-17 RX ADMIN — HYDROMORPHONE HYDROCHLORIDE PRN MG: 2 INJECTION INTRAMUSCULAR; INTRAVENOUS; SUBCUTANEOUS at 06:12

## 2020-02-17 RX ADMIN — OXYCODONE HYDROCHLORIDE PRN MG: 5 TABLET ORAL at 20:43

## 2020-02-17 RX ADMIN — HYDROMORPHONE HYDROCHLORIDE PRN MG: 2 INJECTION INTRAMUSCULAR; INTRAVENOUS; SUBCUTANEOUS at 20:18

## 2020-02-17 RX ADMIN — LORAZEPAM PRN MG: 2 INJECTION INTRAMUSCULAR; INTRAVENOUS at 16:23

## 2020-02-17 RX ADMIN — HYDROMORPHONE HYDROCHLORIDE PRN MG: 2 INJECTION INTRAMUSCULAR; INTRAVENOUS; SUBCUTANEOUS at 10:16

## 2020-02-17 RX ADMIN — HYDROMORPHONE HYDROCHLORIDE PRN MG: 2 INJECTION INTRAMUSCULAR; INTRAVENOUS; SUBCUTANEOUS at 16:15

## 2020-02-17 RX ADMIN — HEPARIN SODIUM PRN MLS/HR: 10000 INJECTION, SOLUTION INTRAVENOUS at 20:41

## 2020-02-18 VITALS — SYSTOLIC BLOOD PRESSURE: 131 MMHG | DIASTOLIC BLOOD PRESSURE: 65 MMHG

## 2020-02-18 VITALS — DIASTOLIC BLOOD PRESSURE: 66 MMHG | SYSTOLIC BLOOD PRESSURE: 103 MMHG

## 2020-02-18 VITALS — DIASTOLIC BLOOD PRESSURE: 66 MMHG | SYSTOLIC BLOOD PRESSURE: 107 MMHG

## 2020-02-18 LAB
ANION GAP SERPL CALC-SCNC: 5 MMOL/L (ref 5–15)
BASOPHILS # BLD AUTO: 0.01 X10^3/UL (ref 0–0.1)
BASOPHILS NFR BLD AUTO: 0 % (ref 0–1)
CALCIUM SERPL-MCNC: 8.1 MG/DL (ref 8.5–10.1)
CHLORIDE SERPL-SCNC: 105 MMOL/L (ref 98–107)
CREAT SERPL-MCNC: 0.75 MG/DL (ref 0.55–1.02)
EOSINOPHIL # BLD AUTO: 0 X10^3/UL (ref 0–0.4)
EOSINOPHIL NFR BLD AUTO: 0 % (ref 1–7)
ERYTHROCYTE [DISTWIDTH] IN BLOOD BY AUTOMATED COUNT: 13.9 % (ref 9.6–15.2)
LYMPHOCYTES # BLD AUTO: 0.29 X10^3/UL (ref 1–3.4)
LYMPHOCYTES NFR BLD AUTO: 4 % (ref 22–44)
MCH RBC QN AUTO: 30 PG (ref 27–34.8)
MCHC RBC AUTO-ENTMCNC: 32 G/DL (ref 32.4–35.8)
MCV RBC AUTO: 93.9 FL (ref 80–100)
MD: (no result)
MONOCYTES # BLD AUTO: 0.07 X10^3/UL (ref 0.2–0.8)
MONOCYTES NFR BLD AUTO: 1 % (ref 2–9)
NEUTROPHILS # BLD AUTO: 6.99 X10^3/UL (ref 1.8–6.8)
NEUTROPHILS NFR BLD AUTO: 95 % (ref 42–75)
PLATELET # BLD AUTO: 181 X10^3/UL (ref 130–400)
PMV BLD AUTO: 9.9 FL (ref 7.4–10.4)
RBC # BLD AUTO: 5.44 X10^6/UL (ref 3.82–5.3)

## 2020-02-18 PROCEDURE — 0XJ60ZZ INSPECTION OF RIGHT UPPER EXTREMITY, OPEN APPROACH: ICD-10-PCS | Performed by: SURGERY

## 2020-02-18 RX ADMIN — HYDROMORPHONE HYDROCHLORIDE PRN MG: 2 INJECTION INTRAMUSCULAR; INTRAVENOUS; SUBCUTANEOUS at 04:00

## 2020-02-18 RX ADMIN — ACETAMINOPHEN PRN MG: 325 TABLET, FILM COATED ORAL at 20:38

## 2020-02-18 RX ADMIN — SODIUM CHLORIDE SCH MLS/HR: 0.9 INJECTION, SOLUTION INTRAVENOUS at 15:30

## 2020-02-18 RX ADMIN — ACETAMINOPHEN PRN MG: 325 TABLET, FILM COATED ORAL at 00:02

## 2020-02-18 RX ADMIN — SODIUM CHLORIDE SCH MLS/HR: 0.9 INJECTION, SOLUTION INTRAVENOUS at 01:42

## 2020-02-18 RX ADMIN — SODIUM CHLORIDE SCH MLS/HR: 0.9 INJECTION, SOLUTION INTRAVENOUS at 23:30

## 2020-02-18 RX ADMIN — OXYCODONE HYDROCHLORIDE PRN MG: 5 TABLET ORAL at 00:41

## 2020-02-18 RX ADMIN — HEPARIN SODIUM PRN UNITS: 5000 INJECTION, SOLUTION INTRAVENOUS; SUBCUTANEOUS at 02:58

## 2020-02-18 RX ADMIN — SODIUM CHLORIDE SCH MLS/HR: 0.9 INJECTION, SOLUTION INTRAVENOUS at 17:26

## 2020-02-18 RX ADMIN — HYDROMORPHONE HYDROCHLORIDE PRN MG: 2 INJECTION INTRAMUSCULAR; INTRAVENOUS; SUBCUTANEOUS at 07:21

## 2020-02-18 RX ADMIN — OXYCODONE HYDROCHLORIDE PRN MG: 5 TABLET ORAL at 20:42

## 2020-02-18 RX ADMIN — HEPARIN SODIUM PRN MLS/HR: 10000 INJECTION, SOLUTION INTRAVENOUS at 17:20

## 2020-02-18 RX ADMIN — SODIUM CHLORIDE SCH MLS/HR: 0.9 INJECTION, SOLUTION INTRAVENOUS at 01:41

## 2020-02-18 RX ADMIN — OXYCODONE HYDROCHLORIDE PRN MG: 5 TABLET ORAL at 05:04

## 2020-02-19 VITALS — SYSTOLIC BLOOD PRESSURE: 107 MMHG | DIASTOLIC BLOOD PRESSURE: 67 MMHG

## 2020-02-19 VITALS — DIASTOLIC BLOOD PRESSURE: 72 MMHG | SYSTOLIC BLOOD PRESSURE: 119 MMHG

## 2020-02-19 VITALS — DIASTOLIC BLOOD PRESSURE: 71 MMHG | SYSTOLIC BLOOD PRESSURE: 127 MMHG

## 2020-02-19 VITALS — SYSTOLIC BLOOD PRESSURE: 108 MMHG | DIASTOLIC BLOOD PRESSURE: 67 MMHG

## 2020-02-19 VITALS — SYSTOLIC BLOOD PRESSURE: 112 MMHG | DIASTOLIC BLOOD PRESSURE: 72 MMHG

## 2020-02-19 RX ADMIN — ACETAMINOPHEN PRN MG: 325 TABLET, FILM COATED ORAL at 06:00

## 2020-02-19 RX ADMIN — SODIUM CHLORIDE SCH MLS/HR: 0.9 INJECTION, SOLUTION INTRAVENOUS at 14:18

## 2020-02-19 RX ADMIN — HEPARIN SODIUM PRN UNITS: 5000 INJECTION, SOLUTION INTRAVENOUS; SUBCUTANEOUS at 16:49

## 2020-02-19 RX ADMIN — OXYCODONE HYDROCHLORIDE PRN MG: 5 TABLET ORAL at 18:25

## 2020-02-19 RX ADMIN — OXYCODONE HYDROCHLORIDE PRN MG: 5 TABLET ORAL at 21:53

## 2020-02-19 RX ADMIN — SODIUM CHLORIDE SCH MLS/HR: 0.9 INJECTION, SOLUTION INTRAVENOUS at 21:54

## 2020-02-19 RX ADMIN — OXYCODONE HYDROCHLORIDE PRN MG: 5 TABLET ORAL at 06:00

## 2020-02-19 RX ADMIN — HEPARIN SODIUM PRN UNITS: 5000 INJECTION, SOLUTION INTRAVENOUS; SUBCUTANEOUS at 01:27

## 2020-02-19 RX ADMIN — OXYCODONE HYDROCHLORIDE PRN MG: 5 TABLET ORAL at 14:25

## 2020-02-19 RX ADMIN — ACETAMINOPHEN PRN MG: 325 TABLET, FILM COATED ORAL at 21:53

## 2020-02-19 RX ADMIN — SODIUM CHLORIDE SCH MLS/HR: 0.9 INJECTION, SOLUTION INTRAVENOUS at 07:44

## 2020-02-19 RX ADMIN — HEPARIN SODIUM PRN MLS/HR: 10000 INJECTION, SOLUTION INTRAVENOUS at 14:15

## 2020-02-19 RX ADMIN — POLYETHYLENE GLYCOL 3350 PRN GM: 17 POWDER, FOR SOLUTION ORAL at 05:59

## 2020-02-19 RX ADMIN — OXYCODONE HYDROCHLORIDE PRN MG: 5 TABLET ORAL at 10:22

## 2020-02-20 VITALS — SYSTOLIC BLOOD PRESSURE: 100 MMHG | DIASTOLIC BLOOD PRESSURE: 59 MMHG

## 2020-02-20 VITALS — SYSTOLIC BLOOD PRESSURE: 146 MMHG | DIASTOLIC BLOOD PRESSURE: 78 MMHG

## 2020-02-20 VITALS — SYSTOLIC BLOOD PRESSURE: 130 MMHG | DIASTOLIC BLOOD PRESSURE: 76 MMHG

## 2020-02-20 VITALS — SYSTOLIC BLOOD PRESSURE: 106 MMHG | DIASTOLIC BLOOD PRESSURE: 74 MMHG

## 2020-02-20 LAB
INR PPP: 0.98 (ref 0.93–1.1)
PROTHROMBIN TIME: 10.4 SECONDS (ref 9.6–11.5)

## 2020-02-20 RX ADMIN — DOCUSATE SODIUM PRN MG: 100 CAPSULE, LIQUID FILLED ORAL at 13:24

## 2020-02-20 RX ADMIN — OXYCODONE HYDROCHLORIDE PRN MG: 5 TABLET ORAL at 05:44

## 2020-02-20 RX ADMIN — HEPARIN SODIUM PRN UNITS: 5000 INJECTION, SOLUTION INTRAVENOUS; SUBCUTANEOUS at 06:55

## 2020-02-20 RX ADMIN — OXYCODONE HYDROCHLORIDE PRN MG: 5 TABLET ORAL at 15:09

## 2020-02-20 RX ADMIN — Medication SCH NOTE: at 12:00

## 2020-02-20 RX ADMIN — ACETAMINOPHEN PRN MG: 325 TABLET, FILM COATED ORAL at 02:07

## 2020-02-20 RX ADMIN — OXYCODONE HYDROCHLORIDE PRN MG: 5 TABLET ORAL at 23:35

## 2020-02-20 RX ADMIN — OXYCODONE HYDROCHLORIDE PRN MG: 5 TABLET ORAL at 10:28

## 2020-02-20 RX ADMIN — SODIUM CHLORIDE SCH MLS/HR: 0.9 INJECTION, SOLUTION INTRAVENOUS at 06:37

## 2020-02-20 RX ADMIN — OXYCODONE HYDROCHLORIDE PRN MG: 5 TABLET ORAL at 19:29

## 2020-02-20 RX ADMIN — ACETAMINOPHEN PRN MG: 325 TABLET, FILM COATED ORAL at 19:29

## 2020-02-20 RX ADMIN — OXYCODONE HYDROCHLORIDE PRN MG: 5 TABLET ORAL at 02:09

## 2020-02-20 RX ADMIN — DOCUSATE SODIUM PRN MG: 100 CAPSULE, LIQUID FILLED ORAL at 19:29

## 2020-02-20 RX ADMIN — POLYETHYLENE GLYCOL 3350 PRN GM: 17 POWDER, FOR SOLUTION ORAL at 19:30

## 2020-02-20 RX ADMIN — HEPARIN SODIUM PRN MLS/HR: 10000 INJECTION, SOLUTION INTRAVENOUS at 08:51

## 2020-02-21 VITALS — DIASTOLIC BLOOD PRESSURE: 68 MMHG | SYSTOLIC BLOOD PRESSURE: 118 MMHG

## 2020-02-21 VITALS — DIASTOLIC BLOOD PRESSURE: 80 MMHG | SYSTOLIC BLOOD PRESSURE: 148 MMHG

## 2020-02-21 VITALS — SYSTOLIC BLOOD PRESSURE: 139 MMHG | DIASTOLIC BLOOD PRESSURE: 82 MMHG

## 2020-02-21 VITALS — SYSTOLIC BLOOD PRESSURE: 147 MMHG | DIASTOLIC BLOOD PRESSURE: 78 MMHG

## 2020-02-21 LAB
INR PPP: 0.98 (ref 0.93–1.1)
PROTHROMBIN TIME: 10.4 SECONDS (ref 9.6–11.5)

## 2020-02-21 RX ADMIN — OXYCODONE HYDROCHLORIDE PRN MG: 5 TABLET ORAL at 19:30

## 2020-02-21 RX ADMIN — OXYCODONE HYDROCHLORIDE PRN MG: 5 TABLET ORAL at 09:24

## 2020-02-21 RX ADMIN — POLYETHYLENE GLYCOL 3350 PRN GM: 17 POWDER, FOR SOLUTION ORAL at 12:18

## 2020-02-21 RX ADMIN — Medication SCH NOTE: at 11:53

## 2020-02-21 RX ADMIN — OXYCODONE HYDROCHLORIDE PRN MG: 5 TABLET ORAL at 13:59

## 2020-02-21 RX ADMIN — OXYCODONE HYDROCHLORIDE PRN MG: 5 TABLET ORAL at 23:36

## 2020-02-21 RX ADMIN — HEPARIN SODIUM PRN MLS/HR: 10000 INJECTION, SOLUTION INTRAVENOUS at 20:41

## 2020-02-21 RX ADMIN — HEPARIN SODIUM PRN MLS/HR: 10000 INJECTION, SOLUTION INTRAVENOUS at 04:52

## 2020-02-21 RX ADMIN — OXYCODONE HYDROCHLORIDE PRN MG: 5 TABLET ORAL at 05:03

## 2020-02-21 RX ADMIN — DOCUSATE SODIUM PRN MG: 100 CAPSULE, LIQUID FILLED ORAL at 12:18

## 2020-02-22 VITALS — DIASTOLIC BLOOD PRESSURE: 76 MMHG | SYSTOLIC BLOOD PRESSURE: 151 MMHG

## 2020-02-22 VITALS — SYSTOLIC BLOOD PRESSURE: 145 MMHG | DIASTOLIC BLOOD PRESSURE: 84 MMHG

## 2020-02-22 VITALS — DIASTOLIC BLOOD PRESSURE: 80 MMHG | SYSTOLIC BLOOD PRESSURE: 152 MMHG

## 2020-02-22 VITALS — SYSTOLIC BLOOD PRESSURE: 152 MMHG | DIASTOLIC BLOOD PRESSURE: 70 MMHG

## 2020-02-22 LAB
ALBUMIN SERPL-MCNC: 2.9 G/DL (ref 3.4–5)
ALP SERPL-CCNC: 88 U/L (ref 45–117)
ALT SERPL-CCNC: 31 U/L (ref 12–78)
ANION GAP SERPL CALC-SCNC: 4 MMOL/L (ref 5–15)
BILIRUB SERPL-MCNC: 0.5 MG/DL (ref 0.2–1)
CALCIUM SERPL-MCNC: 8.9 MG/DL (ref 8.5–10.1)
CHLORIDE SERPL-SCNC: 105 MMOL/L (ref 98–107)
CREAT SERPL-MCNC: 0.79 MG/DL (ref 0.55–1.02)
INR PPP: 1.28 (ref 0.93–1.1)
PROT SERPL-MCNC: 6.2 G/DL (ref 6.4–8.2)
PROTHROMBIN TIME: 13.6 SECONDS (ref 9.6–11.5)

## 2020-02-22 RX ADMIN — OXYCODONE HYDROCHLORIDE PRN MG: 5 TABLET ORAL at 18:09

## 2020-02-22 RX ADMIN — OXYCODONE HYDROCHLORIDE PRN MG: 5 TABLET ORAL at 09:56

## 2020-02-22 RX ADMIN — OXYCODONE HYDROCHLORIDE PRN MG: 5 TABLET ORAL at 22:08

## 2020-02-22 RX ADMIN — Medication SCH NOTE: at 12:00

## 2020-02-22 RX ADMIN — OXYCODONE HYDROCHLORIDE PRN MG: 5 TABLET ORAL at 14:15

## 2020-02-22 RX ADMIN — HEPARIN SODIUM PRN MLS/HR: 10000 INJECTION, SOLUTION INTRAVENOUS at 14:14

## 2020-02-22 RX ADMIN — FUROSEMIDE SCH MG: 40 TABLET ORAL at 16:24

## 2020-02-22 RX ADMIN — OXYCODONE HYDROCHLORIDE PRN MG: 5 TABLET ORAL at 05:11

## 2020-02-23 VITALS — SYSTOLIC BLOOD PRESSURE: 132 MMHG | DIASTOLIC BLOOD PRESSURE: 79 MMHG

## 2020-02-23 VITALS — SYSTOLIC BLOOD PRESSURE: 126 MMHG | DIASTOLIC BLOOD PRESSURE: 85 MMHG

## 2020-02-23 VITALS — DIASTOLIC BLOOD PRESSURE: 84 MMHG | SYSTOLIC BLOOD PRESSURE: 131 MMHG

## 2020-02-23 VITALS — SYSTOLIC BLOOD PRESSURE: 122 MMHG | DIASTOLIC BLOOD PRESSURE: 76 MMHG

## 2020-02-23 LAB
<PLATELET ESTIMATE>: ADEQUATE
BAND#(MANUAL): 0.29 X10^3/UL
BILIRUB DIRECT SERPL-MCNC: NORMAL MG/DL
EOS#(MANUAL): 0.22 X10^3/UL (ref 0–0.4)
EOS% (MANUAL): 3 % (ref 1–7)
ERYTHROCYTE [DISTWIDTH] IN BLOOD BY AUTOMATED COUNT: 14.2 % (ref 9.6–15.2)
INR PPP: 1.75 (ref 0.93–1.1)
LG PLATELETS BLD QL SMEAR: (no result)
LYMPH#(MANUAL): 1.46 X10^3/UL (ref 1–3.4)
LYMPHS% (MANUAL): 20 % (ref 22–44)
MCH RBC QN AUTO: 30.5 PG (ref 27–34.8)
MCHC RBC AUTO-ENTMCNC: 32.5 G/DL (ref 32.4–35.8)
MCV RBC AUTO: 93.9 FL (ref 80–100)
MD: YES
MONOS#(MANUAL): 0.88 X10^3/UL (ref 0.3–2.7)
MONOS% (MANUAL): 12 % (ref 2–9)
NEUTS BAND NFR BLD: 4 % (ref 0–7)
PLATELET # BLD AUTO: 189 X10^3/UL (ref 130–400)
PMV BLD AUTO: 10.3 FL (ref 7.4–10.4)
PROTHROMBIN TIME: 18.7 SECONDS (ref 9.6–11.5)
RBC # BLD AUTO: 4.79 X10^6/UL (ref 3.82–5.3)
SEG#(MANUAL): 4.45 X10^3/UL (ref 1.8–6.8)
SEGS% (MANUAL): 61 % (ref 42–75)

## 2020-02-23 RX ADMIN — OXYCODONE HYDROCHLORIDE PRN MG: 5 TABLET ORAL at 21:00

## 2020-02-23 RX ADMIN — OXYCODONE HYDROCHLORIDE PRN MG: 5 TABLET ORAL at 03:16

## 2020-02-23 RX ADMIN — FUROSEMIDE SCH MG: 40 TABLET ORAL at 16:02

## 2020-02-23 RX ADMIN — HYDROMORPHONE HYDROCHLORIDE PRN MG: 2 INJECTION INTRAMUSCULAR; INTRAVENOUS; SUBCUTANEOUS at 19:27

## 2020-02-23 RX ADMIN — HEPARIN SODIUM PRN MLS/HR: 10000 INJECTION, SOLUTION INTRAVENOUS at 19:34

## 2020-02-23 RX ADMIN — FUROSEMIDE SCH MG: 40 TABLET ORAL at 07:30

## 2020-02-23 RX ADMIN — OXYCODONE HYDROCHLORIDE PRN MG: 5 TABLET ORAL at 10:51

## 2020-02-23 RX ADMIN — HYDROMORPHONE HYDROCHLORIDE PRN MG: 2 INJECTION INTRAMUSCULAR; INTRAVENOUS; SUBCUTANEOUS at 23:03

## 2020-02-23 RX ADMIN — HYDROMORPHONE HYDROCHLORIDE PRN MG: 2 INJECTION INTRAMUSCULAR; INTRAVENOUS; SUBCUTANEOUS at 01:33

## 2020-02-23 RX ADMIN — OXYCODONE HYDROCHLORIDE PRN MG: 5 TABLET ORAL at 16:02

## 2020-02-23 RX ADMIN — POTASSIUM CHLORIDE SCH MEQ: 20 TABLET, EXTENDED RELEASE ORAL at 07:30

## 2020-02-23 RX ADMIN — HYDROMORPHONE HYDROCHLORIDE PRN MG: 2 INJECTION INTRAMUSCULAR; INTRAVENOUS; SUBCUTANEOUS at 13:35

## 2020-02-23 RX ADMIN — OXYCODONE HYDROCHLORIDE PRN MG: 5 TABLET ORAL at 07:05

## 2020-02-23 RX ADMIN — Medication SCH NOTE: at 10:51

## 2020-02-23 RX ADMIN — HYDROMORPHONE HYDROCHLORIDE PRN MG: 2 INJECTION INTRAMUSCULAR; INTRAVENOUS; SUBCUTANEOUS at 04:28

## 2020-02-23 RX ADMIN — HYDROMORPHONE HYDROCHLORIDE PRN MG: 2 INJECTION INTRAMUSCULAR; INTRAVENOUS; SUBCUTANEOUS at 00:22

## 2020-02-23 RX ADMIN — HEPARIN SODIUM PRN MLS/HR: 10000 INJECTION, SOLUTION INTRAVENOUS at 04:27

## 2020-02-24 VITALS — SYSTOLIC BLOOD PRESSURE: 101 MMHG | DIASTOLIC BLOOD PRESSURE: 70 MMHG

## 2020-02-24 VITALS — DIASTOLIC BLOOD PRESSURE: 67 MMHG | SYSTOLIC BLOOD PRESSURE: 99 MMHG

## 2020-02-24 VITALS — DIASTOLIC BLOOD PRESSURE: 74 MMHG | SYSTOLIC BLOOD PRESSURE: 127 MMHG

## 2020-02-24 VITALS — SYSTOLIC BLOOD PRESSURE: 140 MMHG | DIASTOLIC BLOOD PRESSURE: 78 MMHG

## 2020-02-24 LAB
INR PPP: 2.33 (ref 0.93–1.1)
PROTHROMBIN TIME: 24.9 SECONDS (ref 9.6–11.5)

## 2020-02-24 RX ADMIN — OXYCODONE HYDROCHLORIDE PRN MG: 5 TABLET ORAL at 22:31

## 2020-02-24 RX ADMIN — FUROSEMIDE SCH MG: 40 TABLET ORAL at 17:46

## 2020-02-24 RX ADMIN — HEPARIN SODIUM PRN MLS/HR: 10000 INJECTION, SOLUTION INTRAVENOUS at 11:10

## 2020-02-24 RX ADMIN — OXYCODONE HYDROCHLORIDE PRN MG: 5 TABLET ORAL at 14:34

## 2020-02-24 RX ADMIN — HYDROMORPHONE HYDROCHLORIDE PRN MG: 2 INJECTION INTRAMUSCULAR; INTRAVENOUS; SUBCUTANEOUS at 12:09

## 2020-02-24 RX ADMIN — OXYCODONE HYDROCHLORIDE PRN MG: 5 TABLET ORAL at 01:01

## 2020-02-24 RX ADMIN — OXYCODONE HYDROCHLORIDE PRN MG: 5 TABLET ORAL at 09:47

## 2020-02-24 RX ADMIN — ACETAMINOPHEN PRN MG: 325 TABLET, FILM COATED ORAL at 09:47

## 2020-02-24 RX ADMIN — Medication SCH NOTE: at 12:00

## 2020-02-24 RX ADMIN — HYDROMORPHONE HYDROCHLORIDE PRN MG: 2 INJECTION INTRAMUSCULAR; INTRAVENOUS; SUBCUTANEOUS at 08:13

## 2020-02-24 RX ADMIN — OXYCODONE HYDROCHLORIDE PRN MG: 5 TABLET ORAL at 18:37

## 2020-02-24 RX ADMIN — HYDROMORPHONE HYDROCHLORIDE PRN MG: 2 INJECTION INTRAMUSCULAR; INTRAVENOUS; SUBCUTANEOUS at 16:12

## 2020-02-24 RX ADMIN — DOCUSATE SODIUM PRN MG: 100 CAPSULE, LIQUID FILLED ORAL at 08:13

## 2020-02-24 RX ADMIN — FUROSEMIDE SCH MG: 40 TABLET ORAL at 08:13

## 2020-02-24 RX ADMIN — OXYCODONE HYDROCHLORIDE PRN MG: 5 TABLET ORAL at 05:01

## 2020-02-24 RX ADMIN — HYDROMORPHONE HYDROCHLORIDE PRN MG: 2 INJECTION INTRAMUSCULAR; INTRAVENOUS; SUBCUTANEOUS at 03:00

## 2020-02-24 RX ADMIN — POTASSIUM CHLORIDE SCH MEQ: 20 TABLET, EXTENDED RELEASE ORAL at 08:13

## 2020-02-24 RX ADMIN — ACETAMINOPHEN PRN MG: 325 TABLET, FILM COATED ORAL at 14:34

## 2020-02-25 VITALS — DIASTOLIC BLOOD PRESSURE: 70 MMHG | SYSTOLIC BLOOD PRESSURE: 105 MMHG

## 2020-02-25 VITALS — SYSTOLIC BLOOD PRESSURE: 112 MMHG | DIASTOLIC BLOOD PRESSURE: 75 MMHG

## 2020-02-25 VITALS — DIASTOLIC BLOOD PRESSURE: 73 MMHG | SYSTOLIC BLOOD PRESSURE: 112 MMHG

## 2020-02-25 LAB
INR PPP: 2.69 (ref 0.93–1.1)
PROTHROMBIN TIME: 28.8 SECONDS (ref 9.6–11.5)

## 2020-02-25 RX ADMIN — FUROSEMIDE SCH MG: 40 TABLET ORAL at 08:05

## 2020-02-25 RX ADMIN — OXYCODONE HYDROCHLORIDE PRN MG: 5 TABLET ORAL at 10:44

## 2020-02-25 RX ADMIN — HEPARIN SODIUM PRN MLS/HR: 10000 INJECTION, SOLUTION INTRAVENOUS at 04:14

## 2020-02-25 RX ADMIN — OXYCODONE HYDROCHLORIDE PRN MG: 5 TABLET ORAL at 06:44

## 2020-02-25 RX ADMIN — OXYCODONE HYDROCHLORIDE PRN MG: 5 TABLET ORAL at 02:38

## 2020-02-25 RX ADMIN — POTASSIUM CHLORIDE SCH MEQ: 20 TABLET, EXTENDED RELEASE ORAL at 08:05

## 2020-02-25 RX ADMIN — HYDROMORPHONE HYDROCHLORIDE PRN MG: 2 INJECTION INTRAMUSCULAR; INTRAVENOUS; SUBCUTANEOUS at 00:44

## 2020-02-25 RX ADMIN — OXYCODONE HYDROCHLORIDE PRN MG: 5 TABLET ORAL at 14:54

## 2020-03-16 ENCOUNTER — TELEPHONE (OUTPATIENT)
Dept: PHYSICAL THERAPY | Facility: MEDICAL CENTER | Age: 65
End: 2020-03-16

## 2020-03-16 NOTE — OP THERAPY DISCHARGE SUMMARY
Outpatient Physical Therapy  DISCHARGE SUMMARY NOTE      [facility]  [address]  [place] [address]  Phone:  [phone]  Fax:  [phone]    Date of Visit: 03/16/2020    Patient: Aicha Choudhary  YOB: 1955  MRN: [id number]     Referring Provider: No referring provider defined for this encounter.   Referring Diagnosis No admission diagnoses are documented for this encounter.     Physical Therapy Occurrence Codes    Date of onset of impairment:  7/16/18   Date physical therapy care plan established or reviewed:  10/24/18   Date physical therapy treatment started:  10/24/18          Functional Assessment Used        Your patient is being discharged from Physical Therapy with the following comments:   · Patient cancelled or missed more than 2 scheduled appointments/non-compliant  · Patient has failed to schedule or reschedule follow-up visits    Comments:  Patient last seen for therapy 1/14/19. Chart will be closed    Limitations Remaining:  Unknown     Recommendations:  Follow up with primary care    Denver Gonzalez PT, DPT    Date: 3/16/2020

## 2020-10-26 ENCOUNTER — HOSPITAL ENCOUNTER (OUTPATIENT)
Dept: HOSPITAL 8 - CVU | Age: 65
Discharge: HOME | End: 2020-10-26
Attending: FAMILY MEDICINE
Payer: SELF-PAY

## 2020-10-26 DIAGNOSIS — R01.1: ICD-10-CM

## 2020-10-26 DIAGNOSIS — I08.8: Primary | ICD-10-CM

## 2020-10-26 PROCEDURE — 93306 TTE W/DOPPLER COMPLETE: CPT

## 2021-02-01 ENCOUNTER — HOSPITAL ENCOUNTER (OUTPATIENT)
Dept: HOSPITAL 8 - CFH | Age: 66
Discharge: HOME | End: 2021-02-01
Attending: INTERNAL MEDICINE
Payer: COMMERCIAL

## 2021-02-01 DIAGNOSIS — R06.02: ICD-10-CM

## 2021-02-01 DIAGNOSIS — I50.31: Primary | ICD-10-CM

## 2021-02-01 PROCEDURE — 93017 CV STRESS TEST TRACING ONLY: CPT

## 2021-02-01 PROCEDURE — A9502 TC99M TETROFOSMIN: HCPCS

## 2021-02-01 PROCEDURE — 78452 HT MUSCLE IMAGE SPECT MULT: CPT

## 2021-07-27 PROBLEM — M17.0 PRIMARY OSTEOARTHRITIS OF BOTH KNEES: Status: ACTIVE | Noted: 2021-07-27

## 2021-09-06 ENCOUNTER — HOSPITAL ENCOUNTER (EMERGENCY)
Dept: HOSPITAL 8 - ED | Age: 66
Discharge: HOME | End: 2021-09-06
Payer: COMMERCIAL

## 2021-09-06 VITALS — SYSTOLIC BLOOD PRESSURE: 177 MMHG | DIASTOLIC BLOOD PRESSURE: 83 MMHG

## 2021-09-06 VITALS — BODY MASS INDEX: 42.77 KG/M2 | WEIGHT: 272.49 LBS | HEIGHT: 67 IN

## 2021-09-06 DIAGNOSIS — R07.2: ICD-10-CM

## 2021-09-06 DIAGNOSIS — R04.0: Primary | ICD-10-CM

## 2021-09-06 LAB
ALBUMIN SERPL-MCNC: 3.2 G/DL (ref 3.4–5)
ANION GAP SERPL CALC-SCNC: 4 MMOL/L (ref 5–15)
BASOPHILS # BLD AUTO: 0 X10^3/UL (ref 0–0.1)
BASOPHILS NFR BLD AUTO: 0 % (ref 0–1)
CALCIUM SERPL-MCNC: 8.5 MG/DL (ref 8.5–10.1)
CHLORIDE SERPL-SCNC: 105 MMOL/L (ref 98–107)
CREAT SERPL-MCNC: 0.69 MG/DL (ref 0.55–1.02)
EOSINOPHIL # BLD AUTO: 0.2 X10^3/UL (ref 0–0.4)
EOSINOPHIL NFR BLD AUTO: 2 % (ref 1–7)
ERYTHROCYTE [DISTWIDTH] IN BLOOD BY AUTOMATED COUNT: 16.8 % (ref 9.6–15.2)
INR PPP: 3.1 (ref 0.93–1.1)
LYMPHOCYTES # BLD AUTO: 0.5 X10^3/UL (ref 1–3.4)
LYMPHOCYTES NFR BLD AUTO: 7 % (ref 22–44)
MCH RBC QN AUTO: 34.8 PG (ref 27–34.8)
MCHC RBC AUTO-ENTMCNC: 33.1 G/DL (ref 32.4–35.8)
MONOCYTES # BLD AUTO: 0.3 X10^3/UL (ref 0.2–0.8)
MONOCYTES NFR BLD AUTO: 4 % (ref 2–9)
NEUTROPHILS # BLD AUTO: 6.4 X10^3/UL (ref 1.8–6.8)
NEUTROPHILS NFR BLD AUTO: 87 % (ref 42–75)
PLATELET # BLD AUTO: 163 X10^3/UL (ref 130–400)
PMV BLD AUTO: 8.7 FL (ref 7.4–10.4)
PROTHROMBIN TIME: 31.4 SECONDS (ref 9.6–11.5)
RBC # BLD AUTO: 4.47 X10^6/UL (ref 3.82–5.3)
TROPONIN I SERPL-MCNC: < 0.015 NG/ML (ref 0–0.04)

## 2021-09-06 PROCEDURE — 80048 BASIC METABOLIC PNL TOTAL CA: CPT

## 2021-09-06 PROCEDURE — 82040 ASSAY OF SERUM ALBUMIN: CPT

## 2021-09-06 PROCEDURE — 85610 PROTHROMBIN TIME: CPT

## 2021-09-06 PROCEDURE — 99285 EMERGENCY DEPT VISIT HI MDM: CPT

## 2021-09-06 PROCEDURE — 84484 ASSAY OF TROPONIN QUANT: CPT

## 2021-09-06 PROCEDURE — 36415 COLL VENOUS BLD VENIPUNCTURE: CPT

## 2021-09-06 PROCEDURE — 93005 ELECTROCARDIOGRAM TRACING: CPT

## 2021-09-06 PROCEDURE — 71045 X-RAY EXAM CHEST 1 VIEW: CPT

## 2021-09-06 PROCEDURE — 85025 COMPLETE CBC W/AUTO DIFF WBC: CPT

## 2021-09-06 NOTE — NUR
PT PRESENTS WITH C/O NOSE BLEED X1 MONTHS OFF AND ON, TAKES COUMADIN, SKIPPED 
DOSE TODAY. REPORTS SHE BLED FOR 1 HOUR TODAY.

## 2021-09-06 NOTE — NUR
PT RESTING ON EYE CHAIR, NADN/ VSS BESIDES HYPERTENSIVE, PT STATES SHE IS NEVER 
HAD HTN. CHART AWAITING RECHECK. CALL LIGHT WITHIN REACH

## 2021-09-06 NOTE — NUR
pt requested to be wheeled out to lobby, does not want to wait for dc 
paperwork. wheeled to lobby, accompanied by son that will drive her home.

## 2024-05-17 NOTE — DISCHARGE INSTRUCTIONS
You may take the medication as prescribed.  Please call your primary care physician at the opening a business hours to schedule close follow-up appointment.  Additionally please follow-up with your psychiatrist regarding your itching and concern for bugs in the home.  Continue to take all medications as prescribed.  Return to the emergency department if you develop new or worsening symptoms including worsening itching, rashes, fevers, swelling, or any further concerns.  
[Consultation] : a consultation visit